# Patient Record
Sex: MALE | Race: WHITE | NOT HISPANIC OR LATINO | Employment: OTHER | URBAN - METROPOLITAN AREA
[De-identification: names, ages, dates, MRNs, and addresses within clinical notes are randomized per-mention and may not be internally consistent; named-entity substitution may affect disease eponyms.]

---

## 2023-05-30 PROBLEM — T45.1X5A PERIPHERAL NEUROPATHY DUE TO CHEMOTHERAPY (MULTI): Status: ACTIVE | Noted: 2023-05-30

## 2023-05-30 PROBLEM — E78.5 HYPERLIPIDEMIA: Status: ACTIVE | Noted: 2023-05-30

## 2023-05-30 PROBLEM — N28.89 LEFT RENAL MASS: Status: ACTIVE | Noted: 2023-05-30

## 2023-05-30 PROBLEM — E11.9 DIABETES MELLITUS, TYPE 2 (MULTI): Status: ACTIVE | Noted: 2023-05-30

## 2023-05-30 PROBLEM — G89.29 CHRONIC PAIN OF MULTIPLE JOINTS: Status: ACTIVE | Noted: 2023-05-30

## 2023-05-30 PROBLEM — K21.9 CHRONIC GERD: Status: ACTIVE | Noted: 2023-05-30

## 2023-05-30 PROBLEM — R68.89 FORGETFULNESS: Status: ACTIVE | Noted: 2023-05-30

## 2023-05-30 PROBLEM — R44.1 HALLUCINATIONS, VISUAL: Status: ACTIVE | Noted: 2023-05-30

## 2023-05-30 PROBLEM — L02.212 BACK ABSCESS: Status: RESOLVED | Noted: 2023-05-30 | Resolved: 2023-05-30

## 2023-05-30 PROBLEM — M25.50 CHRONIC PAIN OF MULTIPLE JOINTS: Status: ACTIVE | Noted: 2023-05-30

## 2023-05-30 PROBLEM — G62.0 PERIPHERAL NEUROPATHY DUE TO CHEMOTHERAPY (MULTI): Status: ACTIVE | Noted: 2023-05-30

## 2023-05-30 PROBLEM — R29.6 MULTIPLE FALLS: Status: ACTIVE | Noted: 2023-05-30

## 2023-05-30 PROBLEM — I10 HYPERTENSION: Status: ACTIVE | Noted: 2023-05-30

## 2023-05-30 PROBLEM — G91.9 HYDROCEPHALUS (MULTI): Status: ACTIVE | Noted: 2023-05-30

## 2023-05-30 PROBLEM — F32.A DEPRESSION: Status: ACTIVE | Noted: 2023-05-30

## 2023-05-30 PROBLEM — R42 DIZZINESS: Status: ACTIVE | Noted: 2023-05-30

## 2023-05-30 RX ORDER — ALLOPURINOL 100 MG/1
1 TABLET ORAL DAILY
COMMUNITY
End: 2023-08-04 | Stop reason: SDUPTHER

## 2023-05-30 RX ORDER — CHOLECALCIFEROL (VITAMIN D3) 50 MCG
1 TABLET ORAL DAILY
COMMUNITY

## 2023-05-30 RX ORDER — INSULIN GLARGINE 100 [IU]/ML
INJECTION, SOLUTION SUBCUTANEOUS
COMMUNITY
Start: 2020-12-18 | End: 2023-09-11 | Stop reason: SDUPTHER

## 2023-05-30 RX ORDER — LOSARTAN POTASSIUM 100 MG/1
50 TABLET ORAL DAILY
COMMUNITY
End: 2023-09-21 | Stop reason: SDUPTHER

## 2023-05-30 RX ORDER — METFORMIN HYDROCHLORIDE 500 MG/1
2 TABLET ORAL 2 TIMES DAILY
COMMUNITY
End: 2023-06-02 | Stop reason: DRUGHIGH

## 2023-05-30 RX ORDER — DULOXETIN HYDROCHLORIDE 60 MG/1
1 CAPSULE, DELAYED RELEASE ORAL DAILY
COMMUNITY
End: 2023-07-24 | Stop reason: SDUPTHER

## 2023-05-30 RX ORDER — PREGABALIN 225 MG/1
1 CAPSULE ORAL 2 TIMES DAILY
COMMUNITY
Start: 2015-09-09 | End: 2023-06-02 | Stop reason: SDUPTHER

## 2023-05-30 RX ORDER — MULTIVIT-MIN/FA/LYCOPEN/LUTEIN .4-300-25
1 TABLET ORAL DAILY
COMMUNITY

## 2023-05-30 RX ORDER — ICOSAPENT ETHYL 1 G/1
CAPSULE ORAL
COMMUNITY
End: 2023-10-19 | Stop reason: SDUPTHER

## 2023-05-30 RX ORDER — SUCRALFATE 1 G/1
1 TABLET ORAL EVERY 12 HOURS
COMMUNITY
Start: 2022-05-10 | End: 2023-09-11 | Stop reason: SDUPTHER

## 2023-05-30 RX ORDER — IBUPROFEN 800 MG/1
1 TABLET ORAL 3 TIMES DAILY PRN
COMMUNITY
Start: 2021-10-22 | End: 2023-06-27 | Stop reason: SDUPTHER

## 2023-05-30 RX ORDER — VITAMIN B COMPLEX
TABLET ORAL
COMMUNITY

## 2023-05-30 RX ORDER — AMITRIPTYLINE HYDROCHLORIDE 100 MG/1
0.5 TABLET ORAL NIGHTLY
COMMUNITY
Start: 2022-02-24 | End: 2023-08-04 | Stop reason: SDUPTHER

## 2023-05-30 RX ORDER — HYDROCODONE BITARTRATE AND ACETAMINOPHEN 10; 325 MG/1; MG/1
1 TABLET ORAL EVERY 6 HOURS PRN
COMMUNITY

## 2023-05-30 RX ORDER — LIDOCAINE 50 MG/G
PATCH TOPICAL SEE ADMIN INSTRUCTIONS
COMMUNITY

## 2023-05-30 RX ORDER — SIMVASTATIN 10 MG/1
1 TABLET, FILM COATED ORAL DAILY
COMMUNITY
End: 2023-06-15 | Stop reason: SDUPTHER

## 2023-05-30 RX ORDER — ACYCLOVIR 400 MG/1
1 TABLET ORAL 2 TIMES DAILY
COMMUNITY
Start: 2021-02-24 | End: 2023-08-22 | Stop reason: SDUPTHER

## 2023-05-30 RX ORDER — INSULIN LISPRO 100 [IU]/ML
INJECTION, SOLUTION INTRAVENOUS; SUBCUTANEOUS
COMMUNITY
Start: 2021-05-11 | End: 2023-06-02 | Stop reason: ALTCHOICE

## 2023-05-30 RX ORDER — METHOCARBAMOL 750 MG/1
TABLET, FILM COATED ORAL 3 TIMES DAILY PRN
COMMUNITY
End: 2023-07-24 | Stop reason: SDUPTHER

## 2023-05-30 RX ORDER — DULAGLUTIDE 3 MG/.5ML
INJECTION, SOLUTION SUBCUTANEOUS
COMMUNITY
End: 2023-06-02 | Stop reason: SDUPTHER

## 2023-05-30 RX ORDER — PANTOPRAZOLE SODIUM 40 MG/1
TABLET, DELAYED RELEASE ORAL
COMMUNITY
Start: 2021-08-19 | End: 2023-07-24 | Stop reason: SDUPTHER

## 2023-05-30 RX ORDER — TAMSULOSIN HYDROCHLORIDE 0.4 MG/1
2 CAPSULE ORAL DAILY
COMMUNITY
End: 2023-06-02 | Stop reason: SDUPTHER

## 2023-05-30 RX ORDER — AMLODIPINE BESYLATE 5 MG/1
1 TABLET ORAL DAILY
COMMUNITY
End: 2023-07-24 | Stop reason: ALTCHOICE

## 2023-06-02 ENCOUNTER — OFFICE VISIT (OUTPATIENT)
Dept: PRIMARY CARE | Facility: CLINIC | Age: 78
End: 2023-06-02
Payer: MEDICARE

## 2023-06-02 VITALS
HEIGHT: 66 IN | HEART RATE: 68 BPM | WEIGHT: 221 LBS | DIASTOLIC BLOOD PRESSURE: 72 MMHG | BODY MASS INDEX: 35.52 KG/M2 | SYSTOLIC BLOOD PRESSURE: 126 MMHG | OXYGEN SATURATION: 93 %

## 2023-06-02 DIAGNOSIS — E78.5 HYPERLIPIDEMIA, UNSPECIFIED HYPERLIPIDEMIA TYPE: ICD-10-CM

## 2023-06-02 DIAGNOSIS — T45.1X5A PERIPHERAL NEUROPATHY DUE TO CHEMOTHERAPY (MULTI): ICD-10-CM

## 2023-06-02 DIAGNOSIS — R68.89 FORGETFULNESS: ICD-10-CM

## 2023-06-02 DIAGNOSIS — K21.9 CHRONIC GERD: ICD-10-CM

## 2023-06-02 DIAGNOSIS — C83.30 DIFFUSE LARGE B-CELL LYMPHOMA, UNSPECIFIED BODY REGION (MULTI): ICD-10-CM

## 2023-06-02 DIAGNOSIS — E66.01 CLASS 2 SEVERE OBESITY DUE TO EXCESS CALORIES WITH SERIOUS COMORBIDITY AND BODY MASS INDEX (BMI) OF 36.0 TO 36.9 IN ADULT (MULTI): ICD-10-CM

## 2023-06-02 DIAGNOSIS — F33.0 MILD EPISODE OF RECURRENT MAJOR DEPRESSIVE DISORDER (CMS-HCC): ICD-10-CM

## 2023-06-02 DIAGNOSIS — G89.29 CHRONIC PAIN OF MULTIPLE JOINTS: ICD-10-CM

## 2023-06-02 DIAGNOSIS — N40.0 BENIGN PROSTATIC HYPERPLASIA WITHOUT LOWER URINARY TRACT SYMPTOMS: ICD-10-CM

## 2023-06-02 DIAGNOSIS — Z00.00 ROUTINE GENERAL MEDICAL EXAMINATION AT HEALTH CARE FACILITY: Primary | ICD-10-CM

## 2023-06-02 DIAGNOSIS — Z02.89 MEDICATION MANAGEMENT CONTRACT AGREEMENT: ICD-10-CM

## 2023-06-02 DIAGNOSIS — Z79.4 TYPE 2 DIABETES MELLITUS WITHOUT COMPLICATION, WITH LONG-TERM CURRENT USE OF INSULIN (MULTI): ICD-10-CM

## 2023-06-02 DIAGNOSIS — E11.49 TYPE II OR UNSPECIFIED TYPE DIABETES MELLITUS WITH NEUROLOGICAL MANIFESTATIONS, NOT STATED AS UNCONTROLLED(250.60) (MULTI): ICD-10-CM

## 2023-06-02 DIAGNOSIS — G62.0 PERIPHERAL NEUROPATHY DUE TO CHEMOTHERAPY (MULTI): ICD-10-CM

## 2023-06-02 DIAGNOSIS — M25.50 CHRONIC PAIN OF MULTIPLE JOINTS: ICD-10-CM

## 2023-06-02 DIAGNOSIS — E11.9 TYPE 2 DIABETES MELLITUS WITHOUT COMPLICATION, WITH LONG-TERM CURRENT USE OF INSULIN (MULTI): ICD-10-CM

## 2023-06-02 PROBLEM — R44.1 HALLUCINATIONS, VISUAL: Status: RESOLVED | Noted: 2023-05-30 | Resolved: 2023-06-02

## 2023-06-02 PROBLEM — G91.9 HYDROCEPHALUS (MULTI): Status: RESOLVED | Noted: 2023-05-30 | Resolved: 2023-06-02

## 2023-06-02 LAB
AMPHETAMINE (PRESENCE) IN URINE BY SCREEN METHOD: ABNORMAL
BARBITURATES PRESENCE IN URINE BY SCREEN METHOD: ABNORMAL
BENZODIAZEPINE (PRESENCE) IN URINE BY SCREEN METHOD: ABNORMAL
CANNABINOIDS IN URINE BY SCREEN METHOD: ABNORMAL
COCAINE (PRESENCE) IN URINE BY SCREEN METHOD: ABNORMAL
DRUG SCREEN COMMENT URINE: ABNORMAL
FENTANYL URINE: ABNORMAL
METHADONE (PRESENCE) IN URINE BY SCREEN METHOD: ABNORMAL
OPIATES (PRESENCE) IN URINE BY SCREEN METHOD: ABNORMAL
OXYCODONE (PRESENCE) IN URINE BY SCREEN METHOD: ABNORMAL
PHENCYCLIDINE (PRESENCE) IN URINE BY SCREEN METHOD: ABNORMAL
POC ALBUMIN /CREATININE RATIO MANUALLY ENTERED: ABNORMAL UG/MG CREAT
POC HEMOGLOBIN A1C: 7 % (ref 4.2–6.5)
POC URINE ALBUMIN: 80 MG/L
POC URINE CREATININE: 50 MG/DL

## 2023-06-02 PROCEDURE — 4010F ACE/ARB THERAPY RXD/TAKEN: CPT | Performed by: NURSE PRACTITIONER

## 2023-06-02 PROCEDURE — G0439 PPPS, SUBSEQ VISIT: HCPCS | Performed by: NURSE PRACTITIONER

## 2023-06-02 PROCEDURE — 3078F DIAST BP <80 MM HG: CPT | Performed by: NURSE PRACTITIONER

## 2023-06-02 PROCEDURE — 1036F TOBACCO NON-USER: CPT | Performed by: NURSE PRACTITIONER

## 2023-06-02 PROCEDURE — 80366 DRUG SCREENING PREGABALIN: CPT

## 2023-06-02 PROCEDURE — 1159F MED LIST DOCD IN RCRD: CPT | Performed by: NURSE PRACTITIONER

## 2023-06-02 PROCEDURE — 80361 OPIATES 1 OR MORE: CPT

## 2023-06-02 PROCEDURE — 82044 UR ALBUMIN SEMIQUANTITATIVE: CPT | Performed by: NURSE PRACTITIONER

## 2023-06-02 PROCEDURE — 3074F SYST BP LT 130 MM HG: CPT | Performed by: NURSE PRACTITIONER

## 2023-06-02 PROCEDURE — 1157F ADVNC CARE PLAN IN RCRD: CPT | Performed by: NURSE PRACTITIONER

## 2023-06-02 PROCEDURE — 1170F FXNL STATUS ASSESSED: CPT | Performed by: NURSE PRACTITIONER

## 2023-06-02 PROCEDURE — 1160F RVW MEDS BY RX/DR IN RCRD: CPT | Performed by: NURSE PRACTITIONER

## 2023-06-02 PROCEDURE — 83036 HEMOGLOBIN GLYCOSYLATED A1C: CPT | Performed by: NURSE PRACTITIONER

## 2023-06-02 PROCEDURE — 80365 DRUG SCREENING OXYCODONE: CPT

## 2023-06-02 PROCEDURE — 80307 DRUG TEST PRSMV CHEM ANLYZR: CPT

## 2023-06-02 RX ORDER — FLASH GLUCOSE SENSOR
2 KIT MISCELLANEOUS DAILY
COMMUNITY
End: 2023-06-02 | Stop reason: SDUPTHER

## 2023-06-02 RX ORDER — TAMSULOSIN HYDROCHLORIDE 0.4 MG/1
0.8 CAPSULE ORAL DAILY
Qty: 90 CAPSULE | Refills: 2 | Status: SHIPPED | OUTPATIENT
Start: 2023-06-02 | End: 2023-07-24 | Stop reason: SDUPTHER

## 2023-06-02 RX ORDER — PREGABALIN 225 MG/1
225 CAPSULE ORAL 2 TIMES DAILY
Qty: 180 CAPSULE | Refills: 1 | Status: SHIPPED | OUTPATIENT
Start: 2023-06-02 | End: 2024-06-01

## 2023-06-02 RX ORDER — METFORMIN HYDROCHLORIDE 1000 MG/1
1000 TABLET ORAL
COMMUNITY
Start: 2023-01-17 | End: 2023-06-15 | Stop reason: SDUPTHER

## 2023-06-02 RX ORDER — DULAGLUTIDE 3 MG/.5ML
3 INJECTION, SOLUTION SUBCUTANEOUS
Qty: 3 ML | Refills: 1 | Status: SHIPPED | OUTPATIENT
Start: 2023-06-02 | End: 2023-08-31

## 2023-06-02 RX ORDER — ROPINIROLE 0.25 MG/1
0.25 TABLET, FILM COATED ORAL 3 TIMES DAILY
COMMUNITY
Start: 2023-02-21 | End: 2023-06-27 | Stop reason: SDUPTHER

## 2023-06-02 RX ORDER — FLASH GLUCOSE SENSOR
2 KIT MISCELLANEOUS DAILY
Qty: 1 EACH | Refills: 2 | Status: SHIPPED | OUTPATIENT
Start: 2023-06-02

## 2023-06-02 RX ORDER — DONEPEZIL HYDROCHLORIDE 5 MG/1
5 TABLET, FILM COATED ORAL NIGHTLY
COMMUNITY
Start: 2022-06-28 | End: 2023-08-04 | Stop reason: SDUPTHER

## 2023-06-02 RX ORDER — PEN NEEDLE, DIABETIC 32GX 5/32"
NEEDLE, DISPOSABLE MISCELLANEOUS 3 TIMES DAILY
COMMUNITY
Start: 2023-03-10 | End: 2023-07-24 | Stop reason: SDUPTHER

## 2023-06-02 ASSESSMENT — ENCOUNTER SYMPTOMS
GASTROINTESTINAL NEGATIVE: 1
BACK PAIN: 1
RESPIRATORY NEGATIVE: 1
OCCASIONAL FEELINGS OF UNSTEADINESS: 0
ARTHRALGIAS: 1
PSYCHIATRIC NEGATIVE: 1
HEMATOLOGIC/LYMPHATIC NEGATIVE: 1
CONSTITUTIONAL NEGATIVE: 1
LOSS OF SENSATION IN FEET: 0
DEPRESSION: 0
CARDIOVASCULAR NEGATIVE: 1
NEUROLOGICAL NEGATIVE: 1

## 2023-06-02 ASSESSMENT — ACTIVITIES OF DAILY LIVING (ADL)
BATHING: INDEPENDENT
DRESSING: INDEPENDENT
DOING_HOUSEWORK: INDEPENDENT
TAKING_MEDICATION: INDEPENDENT
GROCERY_SHOPPING: INDEPENDENT
MANAGING_FINANCES: INDEPENDENT

## 2023-06-02 ASSESSMENT — COLUMBIA-SUICIDE SEVERITY RATING SCALE - C-SSRS
6. HAVE YOU EVER DONE ANYTHING, STARTED TO DO ANYTHING, OR PREPARED TO DO ANYTHING TO END YOUR LIFE?: NO
1. IN THE PAST MONTH, HAVE YOU WISHED YOU WERE DEAD OR WISHED YOU COULD GO TO SLEEP AND NOT WAKE UP?: NO
2. HAVE YOU ACTUALLY HAD ANY THOUGHTS OF KILLING YOURSELF?: NO

## 2023-06-02 ASSESSMENT — PATIENT HEALTH QUESTIONNAIRE - PHQ9
SUM OF ALL RESPONSES TO PHQ9 QUESTIONS 1 AND 2: 0
1. LITTLE INTEREST OR PLEASURE IN DOING THINGS: NOT AT ALL
2. FEELING DOWN, DEPRESSED OR HOPELESS: NOT AT ALL

## 2023-06-02 NOTE — PATIENT INSTRUCTIONS
Continue to monitor your blood glucose.  No changes to medications    Please follow-up with oncology let me know if you have any issues    Continue to try to increase her activity as tolerated    Please eat a healthy well-balanced diet

## 2023-06-03 ENCOUNTER — TELEPHONE (OUTPATIENT)
Dept: PRIMARY CARE | Facility: CLINIC | Age: 78
End: 2023-06-03
Payer: MEDICARE

## 2023-06-05 NOTE — TELEPHONE ENCOUNTER
Spoke to pharmacist  and she is going to give pt 6 ml of trulicity and 6 each of freestyle joe for 90 days for both

## 2023-06-06 LAB — PREGABALIN,URINE: 213.4 UG/ML

## 2023-06-08 LAB
6-ACETYLMORPHINE: <25 NG/ML
CODEINE: <50 NG/ML
HYDROCODONE: 206 NG/ML
HYDROMORPHONE: 47 NG/ML
MORPHINE URINE: <50 NG/ML
NORHYDROCODONE: 177 NG/ML
NOROXYCODONE: <25 NG/ML
OXYCODONE: <25 NG/ML
OXYMORPHONE: <25 NG/ML

## 2023-06-14 ENCOUNTER — TELEPHONE (OUTPATIENT)
Dept: PRIMARY CARE | Facility: CLINIC | Age: 78
End: 2023-06-14
Payer: MEDICARE

## 2023-06-14 DIAGNOSIS — Z79.4 TYPE 2 DIABETES MELLITUS WITHOUT COMPLICATION, WITH LONG-TERM CURRENT USE OF INSULIN (MULTI): ICD-10-CM

## 2023-06-14 DIAGNOSIS — E78.5 HYPERLIPIDEMIA, UNSPECIFIED HYPERLIPIDEMIA TYPE: ICD-10-CM

## 2023-06-14 DIAGNOSIS — E11.9 TYPE 2 DIABETES MELLITUS WITHOUT COMPLICATION, WITH LONG-TERM CURRENT USE OF INSULIN (MULTI): ICD-10-CM

## 2023-06-14 NOTE — TELEPHONE ENCOUNTER
Pt calling for med refill.    LOV 6/2/2023    Simvastatin 10mg  1 PO every day  90 days    Metformin 1000mg  1 PO BID  90 days    BK Lr

## 2023-06-15 RX ORDER — SIMVASTATIN 10 MG/1
10 TABLET, FILM COATED ORAL DAILY
Qty: 90 TABLET | Refills: 2 | Status: SHIPPED | OUTPATIENT
Start: 2023-06-15

## 2023-06-15 RX ORDER — METFORMIN HYDROCHLORIDE 1000 MG/1
1000 TABLET ORAL
Qty: 180 TABLET | Refills: 2 | Status: SHIPPED | OUTPATIENT
Start: 2023-06-15

## 2023-06-27 ENCOUNTER — TELEPHONE (OUTPATIENT)
Dept: PRIMARY CARE | Facility: CLINIC | Age: 78
End: 2023-06-27
Payer: MEDICARE

## 2023-06-27 DIAGNOSIS — M25.50 CHRONIC PAIN OF MULTIPLE JOINTS: ICD-10-CM

## 2023-06-27 DIAGNOSIS — G62.0 PERIPHERAL NEUROPATHY DUE TO CHEMOTHERAPY (MULTI): ICD-10-CM

## 2023-06-27 DIAGNOSIS — G89.29 CHRONIC PAIN OF MULTIPLE JOINTS: ICD-10-CM

## 2023-06-27 DIAGNOSIS — T45.1X5A PERIPHERAL NEUROPATHY DUE TO CHEMOTHERAPY (MULTI): ICD-10-CM

## 2023-06-27 PROBLEM — M15.9 GENERALIZED OSTEOARTHROSIS, INVOLVING MULTIPLE SITES: Status: ACTIVE | Noted: 2023-06-27

## 2023-06-27 PROBLEM — G47.10 HYPERSOMNIA WITH SLEEP APNEA: Status: ACTIVE | Noted: 2023-06-27

## 2023-06-27 PROBLEM — I10 ESSENTIAL HYPERTENSION, BENIGN: Status: ACTIVE | Noted: 2023-06-27

## 2023-06-27 PROBLEM — G47.30 HYPERSOMNIA WITH SLEEP APNEA: Status: ACTIVE | Noted: 2023-06-27

## 2023-06-27 PROBLEM — M10.9 GOUTY ARTHROPATHY: Status: ACTIVE | Noted: 2023-06-27

## 2023-06-27 NOTE — TELEPHONE ENCOUNTER
Rx Refill Request Telephone Encounter    Name:  Jossue Tam  :  [id number]  Medication Name:    Iburopfen 800 mg 1 tab tid - 90 day  Ropinirole  0.25 mg  1 tab tid - 90 day  Specific Pharmacy location:  [facility]  Date of last appointment:  23  Date of next appointment:  na  Best number to reach patient:  [phone]             Tranexamic Acid Counseling:  Patient advised of the small risk of bleeding problems with tranexamic acid. They were also instructed to call if they developed any nausea, vomiting or diarrhea. All of the patient's questions and concerns were addressed.

## 2023-06-30 RX ORDER — IBUPROFEN 800 MG/1
800 TABLET ORAL 3 TIMES DAILY PRN
Qty: 90 TABLET | Refills: 0 | Status: SHIPPED | OUTPATIENT
Start: 2023-06-30

## 2023-06-30 RX ORDER — ROPINIROLE 0.25 MG/1
0.25 TABLET, FILM COATED ORAL 3 TIMES DAILY
Qty: 90 TABLET | Refills: 0 | Status: SHIPPED | OUTPATIENT
Start: 2023-06-30 | End: 2023-09-21 | Stop reason: SDUPTHER

## 2023-07-05 ENCOUNTER — OFFICE VISIT (OUTPATIENT)
Dept: PRIMARY CARE | Facility: CLINIC | Age: 78
End: 2023-07-05
Payer: MEDICARE

## 2023-07-05 VITALS
DIASTOLIC BLOOD PRESSURE: 67 MMHG | HEART RATE: 84 BPM | SYSTOLIC BLOOD PRESSURE: 96 MMHG | BODY MASS INDEX: 34.06 KG/M2 | OXYGEN SATURATION: 93 % | HEIGHT: 67 IN | TEMPERATURE: 97 F | WEIGHT: 217 LBS

## 2023-07-05 DIAGNOSIS — E11.9 TYPE 2 DIABETES MELLITUS WITHOUT COMPLICATION, WITHOUT LONG-TERM CURRENT USE OF INSULIN (MULTI): ICD-10-CM

## 2023-07-05 DIAGNOSIS — R42 DIZZINESS: Primary | ICD-10-CM

## 2023-07-05 DIAGNOSIS — C83.30 DIFFUSE LARGE B-CELL LYMPHOMA, UNSPECIFIED BODY REGION (MULTI): ICD-10-CM

## 2023-07-05 DIAGNOSIS — I10 ESSENTIAL HYPERTENSION, BENIGN: ICD-10-CM

## 2023-07-05 LAB
POC ALBUMIN /CREATININE RATIO MANUALLY ENTERED: <30 UG/MG CREAT
POC APPEARANCE, URINE: CLEAR
POC BILIRUBIN, URINE: NEGATIVE
POC BLOOD, URINE: NEGATIVE
POC COLOR, URINE: YELLOW
POC GLUCOSE, URINE: ABNORMAL MG/DL
POC KETONES, URINE: ABNORMAL MG/DL
POC LEUKOCYTES, URINE: NEGATIVE
POC NITRITE,URINE: NEGATIVE
POC PH, URINE: 6 PH
POC PROTEIN, URINE: NEGATIVE MG/DL
POC SPECIFIC GRAVITY, URINE: 1.01
POC URINE ALBUMIN: 30 MG/L
POC URINE CREATININE: 100 MG/DL
POC UROBILINOGEN, URINE: 0.2 EU/DL

## 2023-07-05 PROCEDURE — 82044 UR ALBUMIN SEMIQUANTITATIVE: CPT | Performed by: NURSE PRACTITIONER

## 2023-07-05 PROCEDURE — 1036F TOBACCO NON-USER: CPT | Performed by: NURSE PRACTITIONER

## 2023-07-05 PROCEDURE — 1159F MED LIST DOCD IN RCRD: CPT | Performed by: NURSE PRACTITIONER

## 2023-07-05 PROCEDURE — 1160F RVW MEDS BY RX/DR IN RCRD: CPT | Performed by: NURSE PRACTITIONER

## 2023-07-05 PROCEDURE — 81002 URINALYSIS NONAUTO W/O SCOPE: CPT | Performed by: NURSE PRACTITIONER

## 2023-07-05 PROCEDURE — 3078F DIAST BP <80 MM HG: CPT | Performed by: NURSE PRACTITIONER

## 2023-07-05 PROCEDURE — 99214 OFFICE O/P EST MOD 30 MIN: CPT | Performed by: NURSE PRACTITIONER

## 2023-07-05 PROCEDURE — 1157F ADVNC CARE PLAN IN RCRD: CPT | Performed by: NURSE PRACTITIONER

## 2023-07-05 PROCEDURE — 3074F SYST BP LT 130 MM HG: CPT | Performed by: NURSE PRACTITIONER

## 2023-07-05 ASSESSMENT — ENCOUNTER SYMPTOMS
HEADACHES: 0
PSYCHIATRIC NEGATIVE: 1
RESPIRATORY NEGATIVE: 1
LIGHT-HEADEDNESS: 1
BACK PAIN: 1
CARDIOVASCULAR NEGATIVE: 1
DIZZINESS: 1
SPEECH DIFFICULTY: 0
ARTHRALGIAS: 1
FATIGUE: 1
GASTROINTESTINAL NEGATIVE: 1

## 2023-07-05 NOTE — ASSESSMENT & PLAN NOTE
Concerns with symptomatic dizziness.  Patient on multiple sedating type medications.  We will work towards decreasing amitriptyline as he is also on 60 mg of Cymbalta.  Patient currently weaning/using less Lyrica.  We will continue with this.  As long as pain control.  We will also decrease blood pressure medication.  Recent blood work reviewed and stable.  We will follow-up closely if patient continues to have symptoms may need work-up with a CAT scan/MRI

## 2023-07-05 NOTE — PROGRESS NOTES
"Subjective   Patient ID: Jossue Tam is a 78 y.o. male who presents for Dizziness which has been occurring intermittently over the past three months. States he has felt nauseated and has even vomited at times  as  well. Pt feels the Lyrica may be making matters worse. Pt's son is a pharmacist and he advised he cut down the Lyrica dose. Pt has done so and feels this helped the dizziness.     Patient states the dizziness has improved slightly since taking the Lyrica as needed only during the day.  He does take 1 every night.  Discussed with patient he is on multiple blood pressure medications blood pressure was low today.  Patient states he is staying hydrated.  Also reviewed with patient concerns for the amitriptyline Cymbalta all can be increased to dative causing some of his symptoms.  Patient states even with amitriptyline and 100 mg he is not sleeping.  Patient did follow-up with oncology has a PET scan scheduled in 2 weeks.  Patient also follows routinely with pain management         Review of Systems   Constitutional:  Positive for fatigue.   HENT: Negative.     Respiratory: Negative.     Cardiovascular: Negative.    Gastrointestinal: Negative.    Genitourinary: Negative.    Musculoskeletal:  Positive for arthralgias and back pain.   Neurological:  Positive for dizziness and light-headedness. Negative for speech difficulty and headaches.   Psychiatric/Behavioral: Negative.         Objective   BP 96/67   Pulse 84   Temp 36.1 °C (97 °F)   Ht 1.702 m (5' 7\")   Wt 98.4 kg (217 lb)   SpO2 93%   BMI 33.99 kg/m²     Physical Exam  Vitals reviewed.   HENT:      Head: Normocephalic.   Cardiovascular:      Rate and Rhythm: Normal rate.   Pulmonary:      Effort: Pulmonary effort is normal.      Breath sounds: Normal breath sounds.   Musculoskeletal:         General: Normal range of motion.   Skin:     General: Skin is warm.      Capillary Refill: Capillary refill takes less than 2 seconds.   Neurological:      " General: No focal deficit present.      Mental Status: He is oriented to person, place, and time.      Cranial Nerves: No cranial nerve deficit.      Sensory: No sensory deficit.      Motor: No weakness.      Coordination: Coordination normal.   Psychiatric:         Mood and Affect: Mood normal.         Behavior: Behavior normal.         Thought Content: Thought content normal.         Judgment: Judgment normal.         Assessment/Plan   Problem List Items Addressed This Visit       Dizziness - Primary     Concerns with symptomatic dizziness.  Patient on multiple sedating type medications.  We will work towards decreasing amitriptyline as he is also on 60 mg of Cymbalta.  Patient currently weaning/using less Lyrica.  We will continue with this.  As long as pain control.  We will also decrease blood pressure medication.  Recent blood work reviewed and stable.  We will follow-up closely if patient continues to have symptoms may need work-up with a CAT scan/MRI         Diffuse large B-cell lymphoma, unspecified body region (CMS/HCC)     Patient has upcoming PET scan scheduled through oncology         Essential hypertension, benign     Patient hypotensive today.  Discussed we will decrease Cozaar/losartan with close follow-up.  Patient symptomatic been having dizzy episodes.

## 2023-07-05 NOTE — PATIENT INSTRUCTIONS
1. decrease your losartan to half a tab daily  2.  Please take half tablet of amitriptyline nightly     follow-up in 2 to 3 weeks sooner if needed if symptoms worsen or do not improve

## 2023-07-05 NOTE — ASSESSMENT & PLAN NOTE
Patient hypotensive today.  Discussed we will decrease Cozaar/losartan with close follow-up.  Patient symptomatic been having dizzy episodes.

## 2023-07-12 ENCOUNTER — TELEPHONE (OUTPATIENT)
Dept: PRIMARY CARE | Facility: CLINIC | Age: 78
End: 2023-07-12
Payer: MEDICARE

## 2023-07-12 DIAGNOSIS — N40.0 BENIGN PROSTATIC HYPERPLASIA WITHOUT LOWER URINARY TRACT SYMPTOMS: ICD-10-CM

## 2023-07-12 DIAGNOSIS — E11.9 TYPE 2 DIABETES MELLITUS WITHOUT COMPLICATION, WITHOUT LONG-TERM CURRENT USE OF INSULIN (MULTI): ICD-10-CM

## 2023-07-12 NOTE — TELEPHONE ENCOUNTER
Rx Refill Request Telephone Encounter    Name:  Jossue Tam  :  [id number]  Medication Name:    JARDIANCE 25MG Q/D   TAMSULOSIN 0.4MG BID 90 DAY SUPPLY   Specific Pharmacy location:  [facility]   Date of last appointment:  2023  Date of next appointment:  2023  Best number to reach patient:    [phone]

## 2023-07-24 ENCOUNTER — OFFICE VISIT (OUTPATIENT)
Dept: PRIMARY CARE | Facility: CLINIC | Age: 78
End: 2023-07-24
Payer: MEDICARE

## 2023-07-24 VITALS
HEART RATE: 83 BPM | HEIGHT: 67 IN | DIASTOLIC BLOOD PRESSURE: 74 MMHG | BODY MASS INDEX: 33.9 KG/M2 | SYSTOLIC BLOOD PRESSURE: 110 MMHG | WEIGHT: 216 LBS | OXYGEN SATURATION: 96 %

## 2023-07-24 DIAGNOSIS — G62.0 PERIPHERAL NEUROPATHY DUE TO CHEMOTHERAPY (MULTI): ICD-10-CM

## 2023-07-24 DIAGNOSIS — E11.59 TYPE 2 DIABETES MELLITUS WITH OTHER CIRCULATORY COMPLICATION, WITH LONG-TERM CURRENT USE OF INSULIN (MULTI): ICD-10-CM

## 2023-07-24 DIAGNOSIS — I10 PRIMARY HYPERTENSION: Primary | ICD-10-CM

## 2023-07-24 DIAGNOSIS — N40.0 BENIGN PROSTATIC HYPERPLASIA WITHOUT LOWER URINARY TRACT SYMPTOMS: ICD-10-CM

## 2023-07-24 DIAGNOSIS — F33.0 MILD EPISODE OF RECURRENT MAJOR DEPRESSIVE DISORDER (CMS-HCC): ICD-10-CM

## 2023-07-24 DIAGNOSIS — Z79.4 TYPE 2 DIABETES MELLITUS WITH OTHER CIRCULATORY COMPLICATION, WITH LONG-TERM CURRENT USE OF INSULIN (MULTI): ICD-10-CM

## 2023-07-24 DIAGNOSIS — K21.9 CHRONIC GERD: ICD-10-CM

## 2023-07-24 DIAGNOSIS — M51.36 DDD (DEGENERATIVE DISC DISEASE), LUMBAR: ICD-10-CM

## 2023-07-24 DIAGNOSIS — T45.1X5A PERIPHERAL NEUROPATHY DUE TO CHEMOTHERAPY (MULTI): ICD-10-CM

## 2023-07-24 DIAGNOSIS — M50.30 DDD (DEGENERATIVE DISC DISEASE), CERVICAL: ICD-10-CM

## 2023-07-24 PROCEDURE — 99214 OFFICE O/P EST MOD 30 MIN: CPT | Performed by: NURSE PRACTITIONER

## 2023-07-24 PROCEDURE — 3078F DIAST BP <80 MM HG: CPT | Performed by: NURSE PRACTITIONER

## 2023-07-24 PROCEDURE — 1157F ADVNC CARE PLAN IN RCRD: CPT | Performed by: NURSE PRACTITIONER

## 2023-07-24 PROCEDURE — 1159F MED LIST DOCD IN RCRD: CPT | Performed by: NURSE PRACTITIONER

## 2023-07-24 PROCEDURE — 3074F SYST BP LT 130 MM HG: CPT | Performed by: NURSE PRACTITIONER

## 2023-07-24 PROCEDURE — 1125F AMNT PAIN NOTED PAIN PRSNT: CPT | Performed by: NURSE PRACTITIONER

## 2023-07-24 PROCEDURE — 1160F RVW MEDS BY RX/DR IN RCRD: CPT | Performed by: NURSE PRACTITIONER

## 2023-07-24 PROCEDURE — 1036F TOBACCO NON-USER: CPT | Performed by: NURSE PRACTITIONER

## 2023-07-24 RX ORDER — AMOXICILLIN 500 MG/1
TABLET, FILM COATED ORAL
COMMUNITY
Start: 2023-07-06

## 2023-07-24 RX ORDER — PANTOPRAZOLE SODIUM 40 MG/1
TABLET, DELAYED RELEASE ORAL
Qty: 90 TABLET | Refills: 1 | Status: SHIPPED | OUTPATIENT
Start: 2023-07-24

## 2023-07-24 RX ORDER — AMLODIPINE BESYLATE 5 MG/1
5 TABLET ORAL DAILY
Qty: 90 TABLET | Refills: 1 | Status: CANCELLED | OUTPATIENT
Start: 2023-07-24

## 2023-07-24 RX ORDER — NALOXONE HYDROCHLORIDE 4 MG/.1ML
4 SPRAY NASAL AS NEEDED
Qty: 2 EACH | Refills: 0 | Status: SHIPPED | OUTPATIENT
Start: 2023-07-24 | End: 2024-07-23

## 2023-07-24 RX ORDER — METHOCARBAMOL 750 MG/1
750 TABLET, FILM COATED ORAL 3 TIMES DAILY PRN
Qty: 270 TABLET | Refills: 1 | Status: SHIPPED | OUTPATIENT
Start: 2023-07-24

## 2023-07-24 RX ORDER — PEN NEEDLE, DIABETIC 32GX 5/32"
1 NEEDLE, DISPOSABLE MISCELLANEOUS 3 TIMES DAILY
Qty: 270 EACH | Refills: 2 | Status: SHIPPED | OUTPATIENT
Start: 2023-07-24 | End: 2023-10-22

## 2023-07-24 RX ORDER — TAMSULOSIN HYDROCHLORIDE 0.4 MG/1
0.8 CAPSULE ORAL DAILY
Qty: 90 CAPSULE | Refills: 2 | Status: SHIPPED | OUTPATIENT
Start: 2023-07-24

## 2023-07-24 RX ORDER — TAMSULOSIN HYDROCHLORIDE 0.4 MG/1
CAPSULE ORAL
Qty: 90 CAPSULE | Refills: 2 | OUTPATIENT
Start: 2023-07-24

## 2023-07-24 RX ORDER — DULOXETIN HYDROCHLORIDE 60 MG/1
60 CAPSULE, DELAYED RELEASE ORAL DAILY
Qty: 90 CAPSULE | Refills: 1 | Status: SHIPPED | OUTPATIENT
Start: 2023-07-24

## 2023-07-24 ASSESSMENT — PATIENT HEALTH QUESTIONNAIRE - PHQ9
SUM OF ALL RESPONSES TO PHQ9 QUESTIONS 1 AND 2: 0
2. FEELING DOWN, DEPRESSED OR HOPELESS: NOT AT ALL
1. LITTLE INTEREST OR PLEASURE IN DOING THINGS: NOT AT ALL

## 2023-07-24 ASSESSMENT — ENCOUNTER SYMPTOMS
DIZZINESS: 0
RESPIRATORY NEGATIVE: 1
CONSTITUTIONAL NEGATIVE: 1
ARTHRALGIAS: 1
CARDIOVASCULAR NEGATIVE: 1
PSYCHIATRIC NEGATIVE: 1
HEADACHES: 0
BACK PAIN: 1
LIGHT-HEADEDNESS: 0

## 2023-07-24 NOTE — PATIENT INSTRUCTIONS
Please continue to take the Lyrica 1 tablet daily and 2 tablets every other day as this seems to be working to help with your dizziness and you continue to have adequate pain control    Please stop taking amlodipine as your blood pressure is still well controlled and decreasing the dosing of this medication has been beneficial.  If blood pressure does rise we will increase the losartan back to 100 mg daily.    Please continue to eat healthy well-balanced diet    Routine follow-up

## 2023-07-24 NOTE — PROGRESS NOTES
"Subjective   Patient ID: Jossue Tam is a 78 y.o. male who presents for Follow-up.    Here for 3-week follow-up on dizziness/fatigue.  Patient had had low blood pressures.  At that time we did some medication changes we have to his amlodipine as well as his losartan.  Patient states he feels much better.  He is also taking Lyrica once daily and every other day he will take it twice a day.  This is helped with his blood pressure and his overall fogginess and dizziness.  Patient denies any current concerns or issues.    Cardiology: followed up with Dr. Pleitez who thought everything was doing well         Review of Systems   Constitutional: Negative.    Respiratory: Negative.     Cardiovascular: Negative.    Musculoskeletal:  Positive for arthralgias and back pain.   Neurological:  Negative for dizziness, light-headedness and headaches.   Psychiatric/Behavioral: Negative.         Objective   /74   Pulse 83   Ht 1.702 m (5' 7\")   Wt 98 kg (216 lb)   SpO2 96%   BMI 33.83 kg/m²     Physical Exam  Vitals reviewed.   Cardiovascular:      Rate and Rhythm: Normal rate.      Heart sounds: Normal heart sounds.   Pulmonary:      Effort: Pulmonary effort is normal.      Breath sounds: Normal breath sounds.   Skin:     Capillary Refill: Capillary refill takes less than 2 seconds.   Neurological:      General: No focal deficit present.      Mental Status: He is alert and oriented to person, place, and time.   Psychiatric:         Mood and Affect: Mood normal.         Behavior: Behavior normal.         Thought Content: Thought content normal.         Judgment: Judgment normal.         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)    DDD (degenerative disc disease), cervical    Relevant Medications    DULoxetine (Cymbalta) 60 mg DR capsule    methocarbamol (Robaxin) 750 mg tablet    naloxone (Narcan) 4 mg/0.1 mL nasal spray    DDD (degenerative disc disease), lumbar    Relevant Medications    DULoxetine (Cymbalta) 60 mg DR capsule    methocarbamol (Robaxin) 750 mg tablet    naloxone (Narcan) 4 mg/0.1 mL nasal spray     Other Visit Diagnoses       Benign prostatic hyperplasia without lower urinary tract symptoms        Relevant Medications    tamsulosin (Flomax) 0.4 mg 24 hr capsule          Reviewed with patient we will stop the amlodipine due to continued improvement in blood pressure.  Continue with losartan continue to monitor blood pressure blood pressure does elevate we may need to increase the losartan.  Encouragement given to patient to continue to exercise eat healthy well-balanced diet.  Refills sent on multiple medications

## 2023-08-04 DIAGNOSIS — K21.9 CHRONIC GERD: ICD-10-CM

## 2023-08-04 DIAGNOSIS — I10 HYPERTENSION, UNSPECIFIED TYPE: ICD-10-CM

## 2023-08-04 DIAGNOSIS — F33.0 MILD EPISODE OF RECURRENT MAJOR DEPRESSIVE DISORDER (CMS-HCC): Primary | ICD-10-CM

## 2023-08-04 DIAGNOSIS — M10.9 GOUTY ARTHROPATHY: ICD-10-CM

## 2023-08-04 DIAGNOSIS — R68.89 FORGETFULNESS: ICD-10-CM

## 2023-08-04 RX ORDER — AMITRIPTYLINE HYDROCHLORIDE 100 MG/1
50 TABLET ORAL NIGHTLY
Qty: 45 TABLET | Refills: 1 | Status: SHIPPED | OUTPATIENT
Start: 2023-08-04

## 2023-08-04 RX ORDER — AMLODIPINE BESYLATE 5 MG/1
1 TABLET ORAL DAILY
COMMUNITY
End: 2023-08-04 | Stop reason: SDUPTHER

## 2023-08-04 RX ORDER — ALLOPURINOL 100 MG/1
100 TABLET ORAL DAILY
Qty: 90 TABLET | Refills: 1 | Status: SHIPPED | OUTPATIENT
Start: 2023-08-04

## 2023-08-04 RX ORDER — METOCLOPRAMIDE 10 MG/1
5 TABLET ORAL 3 TIMES DAILY
Qty: 270 TABLET | Refills: 1 | Status: SHIPPED | OUTPATIENT
Start: 2023-08-04

## 2023-08-04 RX ORDER — METOCLOPRAMIDE 5 MG/1
5 TABLET ORAL 4 TIMES DAILY
COMMUNITY
End: 2023-08-04 | Stop reason: SDUPTHER

## 2023-08-04 RX ORDER — AMLODIPINE BESYLATE 5 MG/1
5 TABLET ORAL DAILY
Qty: 90 TABLET | Refills: 1 | Status: SHIPPED | OUTPATIENT
Start: 2023-08-04

## 2023-08-04 RX ORDER — DONEPEZIL HYDROCHLORIDE 5 MG/1
5 TABLET, FILM COATED ORAL NIGHTLY
Qty: 90 TABLET | Refills: 1 | Status: SHIPPED | OUTPATIENT
Start: 2023-08-04

## 2023-08-04 NOTE — TELEPHONE ENCOUNTER
Rx Refill Request Telephone Encounter    Name:  Jossue Tam  :  [id number]  Medication Name:      Amloditina 5 mg   Amitriptyline 25 mg  Alpurinolol 100 mg   Donepezil 5 mg 1 tab every day at bed time - 90 day  Metoclopramide 10 mg    Specific Pharmacy location:  [facility]   Date of last appointment:  2023  Date of next appointment:  na  Best number to reach patient:  [phone]

## 2023-08-04 NOTE — TELEPHONE ENCOUNTER
PC to pt. Metoclopramide was not on med list he states GI in [place] prescribed to help him empty his stomach better and he takes it TID. Set up for your authoriztion. ( Wasn't sure what DX to use for amitriptyline

## 2023-08-22 ENCOUNTER — TELEPHONE (OUTPATIENT)
Dept: PRIMARY CARE | Facility: CLINIC | Age: 78
End: 2023-08-22
Payer: MEDICARE

## 2023-08-22 DIAGNOSIS — B00.9 HERPES SIMPLEX: ICD-10-CM

## 2023-08-22 RX ORDER — ACYCLOVIR 400 MG/1
400 TABLET ORAL 2 TIMES DAILY
Qty: 180 TABLET | Refills: 0 | Status: SHIPPED | OUTPATIENT
Start: 2023-08-22

## 2023-08-22 NOTE — TELEPHONE ENCOUNTER
Rx Refill Request Telephone Encounter    Name:  Jossue ADRIANNE Tam  :  [id number]  Medication Name:    ACYCLOVIR 400MG BID 90 DAY   Specific Pharmacy location:  [facility]   Date of last appointment:  2023  Best number to reach patient:  [phone]

## 2023-08-30 ENCOUNTER — HOSPITAL ENCOUNTER (OUTPATIENT)
Dept: DATA CONVERSION | Facility: HOSPITAL | Age: 78
Discharge: HOME | End: 2023-08-30
Payer: MEDICARE

## 2023-09-11 ENCOUNTER — TELEPHONE (OUTPATIENT)
Dept: PRIMARY CARE | Facility: CLINIC | Age: 78
End: 2023-09-11
Payer: MEDICARE

## 2023-09-11 DIAGNOSIS — E11.59 TYPE 2 DIABETES MELLITUS WITH OTHER CIRCULATORY COMPLICATION, WITH LONG-TERM CURRENT USE OF INSULIN (MULTI): ICD-10-CM

## 2023-09-11 DIAGNOSIS — K21.9 CHRONIC GERD: ICD-10-CM

## 2023-09-11 DIAGNOSIS — Z79.4 TYPE 2 DIABETES MELLITUS WITH OTHER CIRCULATORY COMPLICATION, WITH LONG-TERM CURRENT USE OF INSULIN (MULTI): ICD-10-CM

## 2023-09-11 RX ORDER — SUCRALFATE 1 G/1
1 TABLET ORAL EVERY 12 HOURS
Qty: 180 TABLET | Refills: 1 | Status: SHIPPED | OUTPATIENT
Start: 2023-09-11

## 2023-09-11 RX ORDER — INSULIN GLARGINE 100 [IU]/ML
INJECTION, SOLUTION SUBCUTANEOUS
Qty: 3 ML | Refills: 2 | Status: SHIPPED | OUTPATIENT
Start: 2023-09-11 | End: 2023-10-26 | Stop reason: SDUPTHER

## 2023-09-11 NOTE — TELEPHONE ENCOUNTER
Rx Refill Request Telephone Encounter    Name:  Jossue Tam  :  [id number]  Medication Name:    insulin glargine (Lantus) 100 unit/mL (3 mL) pen - 90 day    sucralfate (Carafate) 1 gram tablet - 1 tab every 12 hrs - 90 day    Specific Pharm[facility] location:   [facility]  Date of last appointment:  2023  Date of next appointment:  na  Best number to reach patient:  [phone]

## 2023-09-20 ENCOUNTER — TELEPHONE (OUTPATIENT)
Dept: PRIMARY CARE | Facility: CLINIC | Age: 78
End: 2023-09-20
Payer: MEDICARE

## 2023-09-20 DIAGNOSIS — I10 PRIMARY HYPERTENSION: ICD-10-CM

## 2023-09-20 DIAGNOSIS — G62.0 PERIPHERAL NEUROPATHY DUE TO CHEMOTHERAPY (MULTI): ICD-10-CM

## 2023-09-20 DIAGNOSIS — T45.1X5A PERIPHERAL NEUROPATHY DUE TO CHEMOTHERAPY (MULTI): ICD-10-CM

## 2023-09-20 NOTE — TELEPHONE ENCOUNTER
Rx Refill Request Telephone Encounter    Name:  Jossue Tam  :  [id number]  Medication Name:    losartan (Cozaar) 100 mg tablet 0.5 tablet every day - 90 day  Rovinirole hydrochioride .25 mg tab 1 tab at bedtime -90 day  Specific Pharmacy location:  [facility]  Date of last appointment:  2023  Date of next appointment:  na  Best number to reach patient:  [phone]

## 2023-09-21 RX ORDER — ROPINIROLE 0.25 MG/1
0.25 TABLET, FILM COATED ORAL 3 TIMES DAILY
Qty: 270 TABLET | Refills: 1 | Status: SHIPPED | OUTPATIENT
Start: 2023-09-21

## 2023-09-21 RX ORDER — LOSARTAN POTASSIUM 100 MG/1
50 TABLET ORAL DAILY
Qty: 90 TABLET | Refills: 2 | Status: SHIPPED | OUTPATIENT
Start: 2023-09-21

## 2023-10-04 ENCOUNTER — OUTSIDE PROCEDURE (OUTPATIENT)
Dept: PAIN MEDICINE | Facility: CLINIC | Age: 78
End: 2023-10-04

## 2023-10-04 ENCOUNTER — ANCILLARY PROCEDURE (OUTPATIENT)
Dept: RADIOLOGY | Facility: CLINIC | Age: 78
End: 2023-10-04
Payer: MEDICARE

## 2023-10-04 DIAGNOSIS — M79.18 MYALGIA, OTHER SITE: ICD-10-CM

## 2023-10-04 DIAGNOSIS — M46.1 SACROILIITIS, NOT ELSEWHERE CLASSIFIED (CMS-HCC): ICD-10-CM

## 2023-10-04 PROCEDURE — 77003 FLUOROGUIDE FOR SPINE INJECT: CPT | Mod: RSC

## 2023-10-12 ENCOUNTER — OFFICE VISIT (OUTPATIENT)
Dept: PRIMARY CARE | Facility: CLINIC | Age: 78
End: 2023-10-12
Payer: MEDICARE

## 2023-10-12 VITALS
HEART RATE: 83 BPM | HEIGHT: 66 IN | BODY MASS INDEX: 34.84 KG/M2 | WEIGHT: 216.8 LBS | OXYGEN SATURATION: 96 % | SYSTOLIC BLOOD PRESSURE: 118 MMHG | DIASTOLIC BLOOD PRESSURE: 72 MMHG

## 2023-10-12 DIAGNOSIS — Z23 NEED FOR VACCINATION: ICD-10-CM

## 2023-10-12 DIAGNOSIS — E11.59 TYPE 2 DIABETES MELLITUS WITH OTHER CIRCULATORY COMPLICATION, WITH LONG-TERM CURRENT USE OF INSULIN (MULTI): ICD-10-CM

## 2023-10-12 DIAGNOSIS — Z79.4 TYPE 2 DIABETES MELLITUS WITH OTHER CIRCULATORY COMPLICATION, WITH LONG-TERM CURRENT USE OF INSULIN (MULTI): ICD-10-CM

## 2023-10-12 DIAGNOSIS — L30.1 DYSHIDROTIC ECZEMA: Primary | ICD-10-CM

## 2023-10-12 LAB — POC HEMOGLOBIN A1C: 7.5 % (ref 4.2–6.5)

## 2023-10-12 PROCEDURE — 99214 OFFICE O/P EST MOD 30 MIN: CPT | Performed by: NURSE PRACTITIONER

## 2023-10-12 PROCEDURE — 1036F TOBACCO NON-USER: CPT | Performed by: NURSE PRACTITIONER

## 2023-10-12 PROCEDURE — 3074F SYST BP LT 130 MM HG: CPT | Performed by: NURSE PRACTITIONER

## 2023-10-12 PROCEDURE — 90662 IIV NO PRSV INCREASED AG IM: CPT | Performed by: NURSE PRACTITIONER

## 2023-10-12 PROCEDURE — 1125F AMNT PAIN NOTED PAIN PRSNT: CPT | Performed by: NURSE PRACTITIONER

## 2023-10-12 PROCEDURE — 1159F MED LIST DOCD IN RCRD: CPT | Performed by: NURSE PRACTITIONER

## 2023-10-12 PROCEDURE — 1160F RVW MEDS BY RX/DR IN RCRD: CPT | Performed by: NURSE PRACTITIONER

## 2023-10-12 PROCEDURE — G0008 ADMIN INFLUENZA VIRUS VAC: HCPCS | Performed by: NURSE PRACTITIONER

## 2023-10-12 PROCEDURE — 3078F DIAST BP <80 MM HG: CPT | Performed by: NURSE PRACTITIONER

## 2023-10-12 PROCEDURE — 83036 HEMOGLOBIN GLYCOSYLATED A1C: CPT | Performed by: NURSE PRACTITIONER

## 2023-10-12 RX ORDER — TRIAMCINOLONE ACETONIDE 1 MG/G
CREAM TOPICAL 2 TIMES DAILY
Qty: 15 G | Refills: 0 | Status: SHIPPED | OUTPATIENT
Start: 2023-10-12

## 2023-10-12 ASSESSMENT — ENCOUNTER SYMPTOMS
NEUROLOGICAL NEGATIVE: 1
GASTROINTESTINAL NEGATIVE: 1
CARDIOVASCULAR NEGATIVE: 1
RESPIRATORY NEGATIVE: 1
CONSTITUTIONAL NEGATIVE: 1
PSYCHIATRIC NEGATIVE: 1
BACK PAIN: 1

## 2023-10-12 ASSESSMENT — COLUMBIA-SUICIDE SEVERITY RATING SCALE - C-SSRS
1. IN THE PAST MONTH, HAVE YOU WISHED YOU WERE DEAD OR WISHED YOU COULD GO TO SLEEP AND NOT WAKE UP?: NO
6. HAVE YOU EVER DONE ANYTHING, STARTED TO DO ANYTHING, OR PREPARED TO DO ANYTHING TO END YOUR LIFE?: NO
2. HAVE YOU ACTUALLY HAD ANY THOUGHTS OF KILLING YOURSELF?: NO

## 2023-10-12 NOTE — PROGRESS NOTES
"Subjective   Patient ID: Jossue Tam is a 78 y.o. male who presents for Follow-up (4 month).    DM: Blood sugars have been around 135-170. A1C 7.5% today. Blood sugar went up to 310 a few weeks ago, eating foods in [place]/sugary foods.   HTN: BP stable on 118/72. Still feels he is dizzy when he stands up or bends over.   Depression: Mood been ok. Stable on medication.   HLD: Stable on medication  GERD:  Stable on medication  Lymphoma: Followed by oncology. Saw him recently.  Skin: Patient has skin lesions.   Flu vaccine given today in office.   Back Pain: got the steroid injections 1.5 weeks ago. Back pain has improved.        Review of Systems   Constitutional: Negative.    HENT: Negative.     Respiratory: Negative.     Cardiovascular: Negative.    Gastrointestinal: Negative.    Genitourinary: Negative.    Musculoskeletal:  Positive for back pain.   Neurological: Negative.    Psychiatric/Behavioral: Negative.         Objective   /72   Pulse 83   Ht 1.676 m (5' 6\")   Wt 98.3 kg (216 lb 12.8 oz)   SpO2 96%   BMI 34.99 kg/m²     Physical Exam  Vitals reviewed.   Constitutional:       Appearance: Normal appearance.   HENT:      Head: Normocephalic.   Cardiovascular:      Rate and Rhythm: Normal rate and regular rhythm.      Heart sounds: Normal heart sounds.   Pulmonary:      Effort: Pulmonary effort is normal.      Breath sounds: Normal breath sounds.   Skin:     General: Skin is warm.      Findings: Lesion present.      Comments: Multiple erythematous lesions on bilateral hands. Not painful or itchy.    Neurological:      General: No focal deficit present.      Mental Status: He is alert and oriented to person, place, and time.   Psychiatric:         Mood and Affect: Mood normal.         Behavior: Behavior normal.         Thought Content: Thought content normal.         Judgment: Judgment normal.       Assessment/Plan   Problem List Items Addressed This Visit             ICD-10-CM    Diabetes mellitus, " type 2 (CMS/HCC) E11.9    Relevant Orders    POCT glycosylated hemoglobin (Hb A1C) manually resulted (Completed)     Other Visit Diagnoses         Codes    Dyshidrotic eczema    -  Primary L30.1    Relevant Medications    triamcinolone (Kenalog) 0.1 % cream    Need for vaccination     Z23    Relevant Orders    Flu vaccine, quadrivalent, high-dose, preservative free, age 65y+ (FLUZONE)          Reviewed with patient follow-up in 6 months sooner if needed they are going back to [place] and they have established providers there and will follow back up here when they return

## 2023-10-13 NOTE — PROGRESS NOTES
This note has been moved to another encounter or patient during the course of a chart correction case.  If you need access to the original text of this note, please contact the Health Information Management department.

## 2023-10-18 ENCOUNTER — TELEPHONE (OUTPATIENT)
Dept: PRIMARY CARE | Facility: CLINIC | Age: 78
End: 2023-10-18
Payer: MEDICARE

## 2023-10-18 DIAGNOSIS — E78.5 HYPERLIPIDEMIA, UNSPECIFIED HYPERLIPIDEMIA TYPE: ICD-10-CM

## 2023-10-19 RX ORDER — ICOSAPENT ETHYL 1 G/1
CAPSULE ORAL
Qty: 360 CAPSULE | Refills: 1 | Status: SHIPPED | OUTPATIENT
Start: 2023-10-19

## 2023-10-26 ENCOUNTER — TELEPHONE (OUTPATIENT)
Dept: PRIMARY CARE | Facility: CLINIC | Age: 78
End: 2023-10-26
Payer: MEDICARE

## 2023-10-26 DIAGNOSIS — Z79.4 TYPE 2 DIABETES MELLITUS WITH OTHER CIRCULATORY COMPLICATION, WITH LONG-TERM CURRENT USE OF INSULIN (MULTI): ICD-10-CM

## 2023-10-26 DIAGNOSIS — E11.59 TYPE 2 DIABETES MELLITUS WITH OTHER CIRCULATORY COMPLICATION, WITH LONG-TERM CURRENT USE OF INSULIN (MULTI): ICD-10-CM

## 2023-10-26 NOTE — TELEPHONE ENCOUNTER
Pt calling states he is out of refills for his Lantus Solostar insulin pen. He states he only gets 1 pen at a time so he has to get more pens at a time. Please send in a 90 day supply order.     BK Lr

## 2023-10-27 RX ORDER — INSULIN GLARGINE 100 [IU]/ML
INJECTION, SOLUTION SUBCUTANEOUS
Qty: 3 EACH | Refills: 2 | Status: SHIPPED | OUTPATIENT
Start: 2023-10-27

## 2024-06-14 ENCOUNTER — APPOINTMENT (OUTPATIENT)
Dept: PRIMARY CARE | Facility: CLINIC | Age: 79
End: 2024-06-14
Payer: MEDICARE

## 2024-06-14 VITALS
DIASTOLIC BLOOD PRESSURE: 70 MMHG | OXYGEN SATURATION: 98 % | BODY MASS INDEX: 35.39 KG/M2 | SYSTOLIC BLOOD PRESSURE: 112 MMHG | HEIGHT: 66 IN | HEART RATE: 75 BPM | WEIGHT: 220.2 LBS

## 2024-06-14 DIAGNOSIS — N40.0 BENIGN PROSTATIC HYPERPLASIA WITHOUT LOWER URINARY TRACT SYMPTOMS: ICD-10-CM

## 2024-06-14 DIAGNOSIS — T45.1X5A PERIPHERAL NEUROPATHY DUE TO CHEMOTHERAPY (MULTI): ICD-10-CM

## 2024-06-14 DIAGNOSIS — Z86.010 HISTORY OF COLON POLYPS: ICD-10-CM

## 2024-06-14 DIAGNOSIS — E78.5 HYPERLIPIDEMIA, UNSPECIFIED HYPERLIPIDEMIA TYPE: ICD-10-CM

## 2024-06-14 DIAGNOSIS — G62.0 PERIPHERAL NEUROPATHY DUE TO CHEMOTHERAPY (MULTI): ICD-10-CM

## 2024-06-14 DIAGNOSIS — E11.59 TYPE 2 DIABETES MELLITUS WITH OTHER CIRCULATORY COMPLICATION, WITH LONG-TERM CURRENT USE OF INSULIN (MULTI): ICD-10-CM

## 2024-06-14 DIAGNOSIS — E55.9 VITAMIN D DEFICIENCY: ICD-10-CM

## 2024-06-14 DIAGNOSIS — R01.1 CARDIAC MURMUR: ICD-10-CM

## 2024-06-14 DIAGNOSIS — C83.30 DIFFUSE LARGE B-CELL LYMPHOMA, UNSPECIFIED BODY REGION (MULTI): ICD-10-CM

## 2024-06-14 DIAGNOSIS — Z00.00 ROUTINE ADULT HEALTH MAINTENANCE: ICD-10-CM

## 2024-06-14 DIAGNOSIS — I10 PRIMARY HYPERTENSION: ICD-10-CM

## 2024-06-14 DIAGNOSIS — M51.36 DDD (DEGENERATIVE DISC DISEASE), LUMBAR: Primary | ICD-10-CM

## 2024-06-14 DIAGNOSIS — Z79.4 TYPE 2 DIABETES MELLITUS WITH OTHER CIRCULATORY COMPLICATION, WITH LONG-TERM CURRENT USE OF INSULIN (MULTI): ICD-10-CM

## 2024-06-14 PROBLEM — Z86.0100 HISTORY OF COLON POLYPS: Status: ACTIVE | Noted: 2024-06-14

## 2024-06-14 LAB
POC ALBUMIN /CREATININE RATIO MANUALLY ENTERED: ABNORMAL UG/MG CREAT
POC HEMOGLOBIN A1C: 7 % (ref 4.2–6.5)
POC URINE ALBUMIN: 80 MG/L
POC URINE CREATININE: 100 MG/DL

## 2024-06-14 PROCEDURE — 1160F RVW MEDS BY RX/DR IN RCRD: CPT | Performed by: NURSE PRACTITIONER

## 2024-06-14 PROCEDURE — 82044 UR ALBUMIN SEMIQUANTITATIVE: CPT | Performed by: NURSE PRACTITIONER

## 2024-06-14 PROCEDURE — 1036F TOBACCO NON-USER: CPT | Performed by: NURSE PRACTITIONER

## 2024-06-14 PROCEDURE — 80354 DRUG SCREENING FENTANYL: CPT

## 2024-06-14 PROCEDURE — 80365 DRUG SCREENING OXYCODONE: CPT

## 2024-06-14 PROCEDURE — 80346 BENZODIAZEPINES1-12: CPT

## 2024-06-14 PROCEDURE — 80358 DRUG SCREENING METHADONE: CPT

## 2024-06-14 PROCEDURE — 1170F FXNL STATUS ASSESSED: CPT | Performed by: NURSE PRACTITIONER

## 2024-06-14 PROCEDURE — 80307 DRUG TEST PRSMV CHEM ANLYZR: CPT

## 2024-06-14 PROCEDURE — 99214 OFFICE O/P EST MOD 30 MIN: CPT | Performed by: NURSE PRACTITIONER

## 2024-06-14 PROCEDURE — 80373 DRUG SCREENING TRAMADOL: CPT

## 2024-06-14 PROCEDURE — G0439 PPPS, SUBSEQ VISIT: HCPCS | Performed by: NURSE PRACTITIONER

## 2024-06-14 PROCEDURE — 83036 HEMOGLOBIN GLYCOSYLATED A1C: CPT | Performed by: NURSE PRACTITIONER

## 2024-06-14 PROCEDURE — 3074F SYST BP LT 130 MM HG: CPT | Performed by: NURSE PRACTITIONER

## 2024-06-14 PROCEDURE — 80361 OPIATES 1 OR MORE: CPT

## 2024-06-14 PROCEDURE — 36415 COLL VENOUS BLD VENIPUNCTURE: CPT

## 2024-06-14 PROCEDURE — 1157F ADVNC CARE PLAN IN RCRD: CPT | Performed by: NURSE PRACTITIONER

## 2024-06-14 PROCEDURE — 80368 SEDATIVE HYPNOTICS: CPT

## 2024-06-14 PROCEDURE — 82570 ASSAY OF URINE CREATININE: CPT

## 2024-06-14 PROCEDURE — 1159F MED LIST DOCD IN RCRD: CPT | Performed by: NURSE PRACTITIONER

## 2024-06-14 PROCEDURE — 3078F DIAST BP <80 MM HG: CPT | Performed by: NURSE PRACTITIONER

## 2024-06-14 RX ORDER — DONEPEZIL HYDROCHLORIDE 10 MG/1
10 TABLET, FILM COATED ORAL NIGHTLY
COMMUNITY
Start: 2024-05-27

## 2024-06-14 RX ORDER — HYDROCODONE BITARTRATE AND ACETAMINOPHEN 10; 325 MG/1; MG/1
1 TABLET ORAL EVERY 6 HOURS PRN
Qty: 120 TABLET | Refills: 0 | Status: SHIPPED | OUTPATIENT
Start: 2024-06-14 | End: 2024-07-14

## 2024-06-14 RX ORDER — TAMSULOSIN HYDROCHLORIDE 0.4 MG/1
0.8 CAPSULE ORAL DAILY
Qty: 90 CAPSULE | Refills: 2 | Status: SHIPPED | OUTPATIENT
Start: 2024-06-14

## 2024-06-14 RX ORDER — PREGABALIN 300 MG/1
300 CAPSULE ORAL 2 TIMES DAILY
Qty: 180 CAPSULE | Refills: 0 | Status: SHIPPED | OUTPATIENT
Start: 2024-06-14 | End: 2024-09-12

## 2024-06-14 RX ORDER — AMITRIPTYLINE HYDROCHLORIDE 25 MG/1
25 TABLET, FILM COATED ORAL NIGHTLY
COMMUNITY
Start: 2024-05-27 | End: 2024-06-14 | Stop reason: WASHOUT

## 2024-06-14 ASSESSMENT — ENCOUNTER SYMPTOMS
SLEEP DISTURBANCE: 1
HEADACHES: 0
NAUSEA: 0
OCCASIONAL FEELINGS OF UNSTEADINESS: 0
DIZZINESS: 0
SHORTNESS OF BREATH: 0
FEVER: 0
JOINT SWELLING: 0
WOUND: 0
SEIZURES: 0
ADENOPATHY: 0
EYE PAIN: 0
DIFFICULTY URINATING: 0
MYALGIAS: 0
COUGH: 0
COLOR CHANGE: 0
ABDOMINAL PAIN: 0
DEPRESSION: 0
NUMBNESS: 1
TROUBLE SWALLOWING: 0
DIARRHEA: 0
WEAKNESS: 0
ABDOMINAL DISTENTION: 0
PALPITATIONS: 0
CONSTIPATION: 0
WHEEZING: 0
FATIGUE: 0
CHILLS: 0
BRUISES/BLEEDS EASILY: 0
LOSS OF SENSATION IN FEET: 0

## 2024-06-14 ASSESSMENT — COLUMBIA-SUICIDE SEVERITY RATING SCALE - C-SSRS
2. HAVE YOU ACTUALLY HAD ANY THOUGHTS OF KILLING YOURSELF?: NO
1. IN THE PAST MONTH, HAVE YOU WISHED YOU WERE DEAD OR WISHED YOU COULD GO TO SLEEP AND NOT WAKE UP?: NO
6. HAVE YOU EVER DONE ANYTHING, STARTED TO DO ANYTHING, OR PREPARED TO DO ANYTHING TO END YOUR LIFE?: NO

## 2024-06-14 ASSESSMENT — ACTIVITIES OF DAILY LIVING (ADL)
TAKING_MEDICATION: INDEPENDENT
DRESSING: INDEPENDENT
MANAGING_FINANCES: INDEPENDENT
BATHING: INDEPENDENT
GROCERY_SHOPPING: INDEPENDENT
DOING_HOUSEWORK: INDEPENDENT

## 2024-06-14 ASSESSMENT — PATIENT HEALTH QUESTIONNAIRE - PHQ9
2. FEELING DOWN, DEPRESSED OR HOPELESS: NOT AT ALL
1. LITTLE INTEREST OR PLEASURE IN DOING THINGS: NOT AT ALL
SUM OF ALL RESPONSES TO PHQ9 QUESTIONS 1 AND 2: 0

## 2024-06-14 NOTE — ASSESSMENT & PLAN NOTE
Echo ordered today for monitoring purposes of murmur.  Will send some results to patient's cardiologist, Dr. Pleitez.  Patient to notify provider for any new cardiac concerns.

## 2024-06-14 NOTE — ASSESSMENT & PLAN NOTE
Gastroenterologist referral placed today in order to assess need for additional colonoscopies given patient age of 78

## 2024-06-14 NOTE — ASSESSMENT & PLAN NOTE
Blood pressure stable on amlodipine and Cozaar.  Will continue to monitor patient's blood pressure at subsequent office visits.  He is to maintain routine follow-up with cardiology.  Provider to be notified of any new cardiac concerns.

## 2024-06-14 NOTE — ASSESSMENT & PLAN NOTE
Lab Results   Component Value Date    HGBA1C 7.0 (A) 06/14/2024      Patient to continue current diabetic regiment.  He remains on Jardiance daily, twice daily metformin with once daily insulin dosing.  Patient to notify provider for any persistent issues with hypo or hyperglycemia.  He is to continue to allow to use continuous glucose monitor to assist with tighter blood glucose control.   MVP 3.12cm

## 2024-06-14 NOTE — ASSESSMENT & PLAN NOTE
Patient to maintain routine follow-up with oncology for continued evaluation and treatment recommendations.

## 2024-06-14 NOTE — PROGRESS NOTES
Subjective   Patient ID: Jossue Tam is a 78 y.o. male who presents for Medicare Annual Wellness Visit Subsequent and Follow-up (Med review).    Patient seen today for routine follow-up and Medicare annual wellness exam.  Patient seen sitting up in room, in no acute distress.  He is alert, oriented and appears in fair spirits.  Patient and spouse recently returned back to [place] from [place].  They divide their time fairly evenly between the 2 states so they can visit with all of their family members.  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  He is currently taking Lyrica and is agreeable for dose adjustment.  Patient also receives Norco routinely for chronic back issues.  Patient also follows routinely with pain management for additional treatment recommendations.  Patient follows routinely with oncology for non-Hodgkin's lymphoma.  He appears to be doing well after stage IV cancer diagnosis.  Patient denies any mood concerns but does admit to poor quality sleep.  He does have obstructive sleep apnea but did not bring his CPAP home with him from [place].  Patient feels like he has been sleeping longer hours but denies any increased fatigue during the day.  Patient denies any issues with shortness of breath or chest pain.  Patient is a diabetic and utilizes a continuous glucose monitor.  He states that his readings typically range from between 100 - 225.  He denies any episodes of hypoglycemia but does have an occasional hyperglycemic episode.  Patient states that he does his best to follow a diabetic diet.  No reported issues with appetite or staying hydrated.  Patient admits to intermittent issues with diarrhea secondary to eating fruits.  He denies any associated abdominal pain or cramping.  Patient has a history of colon polyps and is agreeable for gastroenterology follow-up for recommendations regarding screening.  Patient  has a history of hypertension for which she follows with cardiology for.  He denies any issues with chest pain, shortness of breath, headaches, blurred vision or other cardiac concerns.  Patient wears glasses for reading and denies any headaches or blurred vision.  No dental concerns reported at this time.  Medications and chart reviewed.  No other acute concerns voiced at this time.         Current Outpatient Medications on File Prior to Visit   Medication Sig Dispense Refill    acyclovir (Zovirax) 400 mg tablet Take 1 tablet (400 mg) by mouth 2 times a day. 180 tablet 0    allopurinol (Zyloprim) 100 mg tablet Take 1 tablet (100 mg) by mouth once daily. 90 tablet 1    amLODIPine (Norvasc) 5 mg tablet Take 1 tablet (5 mg) by mouth once daily. 90 tablet 1    cholecalciferol (Vitamin D-3) 50 MCG (2000 UT) tablet Take 1 tablet (2,000 Units) by mouth once daily.      cyanocobalamin, vitamin B-12, 2,500 mcg tablet, sublingual TAKE AS DIRECTED.      donepezil (Aricept) 10 mg tablet Take 1 tablet (10 mg) by mouth once daily at bedtime.      DULoxetine (Cymbalta) 60 mg DR capsule Take 1 capsule (60 mg) by mouth once daily. 90 capsule 1    empagliflozin (Jardiance) 25 mg Take 1 tablet (25 mg) by mouth once daily. 90 tablet 1    FreeStyle Yo sensor system (FreeStyle Yo 14 Day Sensor) kit Inject 2 each under the skin once daily. Use as instructed 1 each 2    ibuprofen 800 mg tablet Take 1 tablet (800 mg) by mouth 3 times a day as needed for moderate pain (4 - 6). 90 tablet 0    icosapent ethyL (Vascepa) 1 gram capsule TAKE 4 CAPSULES BY MOUTH EVERY DAY AS DIRECTED 360 capsule 1    insulin glargine (Lantus) 100 unit/mL (3 mL) pen inject 25 units under the skin once daily at bedtime 3 each 2    lidocaine (Lidoderm) 5 % patch see administration instructions. Apply as directed by physician      losartan (Cozaar) 100 mg tablet Take 0.5 tablets (50 mg) by mouth once daily. 90 tablet 2    metFORMIN (Glucophage) 1,000 mg tablet  Take 1 tablet (1,000 mg) by mouth 2 times a day with meals. 180 tablet 2    methocarbamol (Robaxin) 750 mg tablet Take 1 tablet (750 mg) by mouth 3 times a day as needed for muscle spasms. 270 tablet 1    metoclopramide (Reglan) 10 mg tablet Take 0.5 tablets (5 mg) by mouth 3 times a day. 270 tablet 1    multivit-iron-minerals-folic acid (Centrum Silver) 0.4 mg-300 mcg- 250 mcg tab Take 1 tablet by mouth once daily.      naloxone (Narcan) 4 mg/0.1 mL nasal spray Administer 1 spray (4 mg) into affected nostril(s) if needed for opioid reversal. May repeat every 2-3 minutes if needed, alternating nostrils, until medical assistance becomes available. 2 each 0    pantoprazole (ProtoNix) 40 mg EC tablet 1 pill daily 90 tablet 1    rOPINIRole (Requip) 0.25 mg tablet Take 1 tablet (0.25 mg) by mouth 3 times a day. 270 tablet 1    simvastatin (Zocor) 10 mg tablet Take 1 tablet (10 mg) by mouth once daily. 90 tablet 2    sucralfate (Carafate) 1 gram tablet Take 1 tablet (1 g) by mouth every 12 hours. 180 tablet 1    triamcinolone (Kenalog) 0.1 % cream Apply topically 2 times a day. Apply to affected area 1-2 times daily as needed. 15 g 0    [DISCONTINUED] amitriptyline (Elavil) 25 mg tablet Take 1 tablet (25 mg) by mouth once daily at bedtime.      [DISCONTINUED] HYDROcodone-acetaminophen (Norco)  mg tablet Take 1 tablet by mouth every 6 hours if needed.      [DISCONTINUED] tamsulosin (Flomax) 0.4 mg 24 hr capsule Take 2 capsules (0.8 mg) by mouth once daily. 90 capsule 2    amitriptyline (Elavil) 100 mg tablet Take 0.5 tablets (50 mg) by mouth once daily at bedtime. 45 tablet 1    dulaglutide (Trulicity) 3 mg/0.5 mL pen injector Inject 3 mg under the skin 1 (one) time per week. 3 mL 1    [DISCONTINUED] amoxicillin (Amoxil) 500 mg tablet TAKE 4 TABLETS BY MOUTH 1 HOUR PRIOR TO APPOINTMENT.      [DISCONTINUED] donepezil (Aricept) 5 mg tablet Take 1 tablet (5 mg) by mouth once daily at bedtime. 90 tablet 1     "[DISCONTINUED] pregabalin (Lyrica) 225 mg capsule Take 1 capsule (225 mg) by mouth 2 times a day. 180 capsule 1     No current facility-administered medications on file prior to visit.       Past Medical History:   Diagnosis Date    Back abscess 05/30/2023    Hallucinations, visual 05/30/2023        Past Surgical History:   Procedure Laterality Date    OTHER SURGICAL HISTORY  06/24/2021    Ankle surgery        No family history on file.     Review of Systems   Constitutional:  Negative for chills, fatigue and fever.   HENT:  Positive for hearing loss. Negative for dental problem and trouble swallowing.    Eyes:  Negative for pain and visual disturbance.        Wears glasses for reading   Respiratory:  Negative for cough, shortness of breath and wheezing.    Cardiovascular:  Negative for chest pain, palpitations and leg swelling.   Gastrointestinal:  Negative for abdominal distention, abdominal pain, constipation, diarrhea and nausea.   Endocrine: Negative for cold intolerance and heat intolerance.   Genitourinary:  Negative for difficulty urinating.   Musculoskeletal:  Negative for gait problem, joint swelling and myalgias.   Skin:  Negative for color change, pallor, rash and wound.   Allergic/Immunologic: Negative for environmental allergies and food allergies.   Neurological:  Positive for numbness. Negative for dizziness, seizures, weakness and headaches.        Neuropathy in hands secondary to chemo; neuropathy in feet secondary to diabetes   Hematological:  Negative for adenopathy. Does not bruise/bleed easily.   Psychiatric/Behavioral:  Positive for sleep disturbance. Negative for behavioral problems.         Positive for poor quality sleep   All other systems reviewed and are negative.      Objective   /70   Pulse 75   Ht 1.676 m (5' 6\")   Wt 99.9 kg (220 lb 3.2 oz)   SpO2 98%   BMI 35.54 kg/m²     Physical Exam  Constitutional:       General: He is not in acute distress.     Appearance: Normal " appearance. He is not toxic-appearing.   HENT:      Head: Normocephalic and atraumatic.      Right Ear: Tympanic membrane, ear canal and external ear normal.      Left Ear: Tympanic membrane, ear canal and external ear normal.      Nose: Nose normal.      Mouth/Throat:      Mouth: Mucous membranes are moist.      Pharynx: Oropharynx is clear.   Eyes:      Extraocular Movements: Extraocular movements intact.      Conjunctiva/sclera: Conjunctivae normal.      Pupils: Pupils are equal, round, and reactive to light.   Cardiovascular:      Rate and Rhythm: Normal rate and regular rhythm.      Pulses: Normal pulses.      Heart sounds: Murmur heard.   Pulmonary:      Effort: Pulmonary effort is normal.      Breath sounds: Normal breath sounds. No wheezing.   Abdominal:      General: Bowel sounds are normal.      Palpations: Abdomen is soft.   Musculoskeletal:         General: No swelling.      Cervical back: Normal range of motion and neck supple.   Skin:     General: Skin is warm and dry.   Neurological:      General: No focal deficit present.      Mental Status: He is alert and oriented to person, place, and time. Mental status is at baseline.      Cranial Nerves: No cranial nerve deficit.      Motor: No weakness.   Psychiatric:         Mood and Affect: Mood normal.         Behavior: Behavior normal.         Thought Content: Thought content normal.         Judgment: Judgment normal.       Assessment/Plan   Problem List Items Addressed This Visit             ICD-10-CM    Diabetes mellitus, type 2 (Multi) E11.9     Lab Results   Component Value Date    HGBA1C 7.0 (A) 06/14/2024      Patient to continue current diabetic regiment.  He remains on Jardiance daily, twice daily metformin with once daily insulin dosing.  Patient to notify provider for any persistent issues with hypo or hyperglycemia.  He is to continue to allow to use continuous glucose monitor to assist with tighter blood glucose control.         Relevant Orders     POCT glycosylated hemoglobin (Hb A1C) manually resulted (Completed)    POCT Albumin Random Urine manually resulted (Completed)    CBC and Auto Differential    Comprehensive Metabolic Panel    Hyperlipidemia E78.5     Patient continues on Zocor.  Lipid panel ordered today for monitoring purposes.         Hypertension I10     Blood pressure stable on amlodipine and Cozaar.  Will continue to monitor patient's blood pressure at subsequent office visits.  He is to maintain routine follow-up with cardiology.  Provider to be notified of any new cardiac concerns.         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  Patient to schedule follow-up with pain management for additional treatment recommendations.         Relevant Medications    pregabalin (Lyrica) 300 mg capsule    Other Relevant Orders    Opiate/Opioid/Benzo Prescription Compliance    Drug Screen, Urine With Reflex to Confirmation    Diffuse large B-cell lymphoma, unspecified body region (Multi) C83.30     Patient to maintain routine follow-up with oncology for continued evaluation and treatment recommendations.         DDD (degenerative disc disease), lumbar - Primary M51.36     Norco reordered.  Opioid consent and urine completed today         Relevant Medications    HYDROcodone-acetaminophen (Norco)  mg tablet    Other Relevant Orders    Opiate/Opioid/Benzo Prescription Compliance    Drug Screen, Urine With Reflex to Confirmation    VALENTINO with Reflex to FELI    Sedimentation Rate    C-Reactive Protein    Vitamin D deficiency E55.9    Relevant Orders    Vitamin D 25-Hydroxy,Total (for eval of Vitamin D levels)    Cardiac murmur R01.1     Echo ordered today for monitoring purposes of murmur.  Will send some results to patient's cardiologist, Dr. Pleitez.  Patient to notify provider for any new cardiac concerns.         Relevant Orders    Transthoracic Echo (TTE) Complete    Routine adult health  maintenance Z00.00     Routine labs ordered today for monitoring purposes         Relevant Orders    CBC and Auto Differential    Comprehensive Metabolic Panel    Lipid Panel    Vitamin D 25-Hydroxy,Total (for eval of Vitamin D levels)    TSH with reflex to Free T4 if abnormal    Prostate Specific Antigen    VALENTINO with Reflex to FELI    Sedimentation Rate    C-Reactive Protein    History of colon polyps Z86.010     Gastroenterologist referral placed today in order to assess need for additional colonoscopies given patient age of 78         Relevant Orders    Referral to Gastroenterology     Other Visit Diagnoses         Codes    Benign prostatic hyperplasia without lower urinary tract symptoms     N40.0    Relevant Medications    tamsulosin (Flomax) 0.4 mg 24 hr capsule

## 2024-06-14 NOTE — ASSESSMENT & PLAN NOTE
Will increase Lyrica dosing.  Patient to schedule follow-up with pain management for additional treatment recommendations.

## 2024-06-15 LAB
AMPHETAMINES UR QL SCN: NORMAL
BARBITURATES UR QL SCN: NORMAL
BZE UR QL SCN: NORMAL
CANNABINOIDS UR QL SCN: NORMAL
CREAT UR-MCNC: 68.7 MG/DL (ref 20–370)
PCP UR QL SCN: NORMAL

## 2024-06-20 LAB
1OH-MIDAZOLAM UR CFM-MCNC: <25 NG/ML
6MAM UR CFM-MCNC: <25 NG/ML
7AMINOCLONAZEPAM UR CFM-MCNC: <25 NG/ML
A-OH ALPRAZ UR CFM-MCNC: <25 NG/ML
ALPRAZ UR CFM-MCNC: <25 NG/ML
CHLORDIAZEP UR CFM-MCNC: <25 NG/ML
CLONAZEPAM UR CFM-MCNC: <25 NG/ML
CODEINE UR CFM-MCNC: <50 NG/ML
DIAZEPAM UR CFM-MCNC: <25 NG/ML
EDDP UR CFM-MCNC: <25 NG/ML
FENTANYL UR CFM-MCNC: <2.5 NG/ML
HYDROCODONE CTO UR CFM-MCNC: 1736 NG/ML
HYDROMORPHONE UR CFM-MCNC: 152 NG/ML
LORAZEPAM UR CFM-MCNC: <25 NG/ML
METHADONE UR CFM-MCNC: <25 NG/ML
MIDAZOLAM UR CFM-MCNC: <25 NG/ML
MORPHINE UR CFM-MCNC: <50 NG/ML
NORDIAZEPAM UR CFM-MCNC: <25 NG/ML
NORFENTANYL UR CFM-MCNC: <2.5 NG/ML
NORHYDROCODONE UR CFM-MCNC: 685 NG/ML
NOROXYCODONE UR CFM-MCNC: <25 NG/ML
NORTRAMADOL UR-MCNC: <50 NG/ML
OXAZEPAM UR CFM-MCNC: <25 NG/ML
OXYCODONE UR CFM-MCNC: <25 NG/ML
OXYMORPHONE UR CFM-MCNC: <25 NG/ML
TEMAZEPAM UR CFM-MCNC: <25 NG/ML
TRAMADOL UR CFM-MCNC: <50 NG/ML
ZOLPIDEM UR CFM-MCNC: <25 NG/ML
ZOLPIDEM UR-MCNC: <25 NG/ML

## 2024-07-01 ENCOUNTER — TELEPHONE (OUTPATIENT)
Dept: PRIMARY CARE | Facility: CLINIC | Age: 79
End: 2024-07-01
Payer: MEDICARE

## 2024-07-01 DIAGNOSIS — T45.1X5A PERIPHERAL NEUROPATHY DUE TO CHEMOTHERAPY (MULTI): ICD-10-CM

## 2024-07-01 DIAGNOSIS — G62.0 PERIPHERAL NEUROPATHY DUE TO CHEMOTHERAPY (MULTI): ICD-10-CM

## 2024-07-01 DIAGNOSIS — E78.5 HYPERLIPIDEMIA, UNSPECIFIED HYPERLIPIDEMIA TYPE: ICD-10-CM

## 2024-07-01 RX ORDER — ROPINIROLE 0.25 MG/1
0.25 TABLET, FILM COATED ORAL 3 TIMES DAILY
Qty: 270 TABLET | Refills: 1 | Status: SHIPPED | OUTPATIENT
Start: 2024-07-01

## 2024-07-01 RX ORDER — SIMVASTATIN 10 MG/1
10 TABLET, FILM COATED ORAL DAILY
Qty: 90 TABLET | Refills: 2 | Status: SHIPPED | OUTPATIENT
Start: 2024-07-01

## 2024-07-01 NOTE — TELEPHONE ENCOUNTER
Rx Refill Request Telephone Encounter    Name:  Jossue Tam  :  [id number]  Medication Name:    SIMVASTATIN 10MG Q/D 90 DAY   ROPINIROLE 25MG BID   Specific Pharmacy location:  [facility]    Date of last appointment:  2024  Date of next appointment:  2024  Best number to reach patient:  [phone]

## 2024-07-11 ENCOUNTER — OFFICE VISIT (OUTPATIENT)
Dept: PAIN MEDICINE | Facility: CLINIC | Age: 79
End: 2024-07-11
Payer: MEDICARE

## 2024-07-11 VITALS
SYSTOLIC BLOOD PRESSURE: 112 MMHG | HEART RATE: 81 BPM | DIASTOLIC BLOOD PRESSURE: 74 MMHG | BODY MASS INDEX: 35.36 KG/M2 | OXYGEN SATURATION: 96 % | RESPIRATION RATE: 16 BRPM | HEIGHT: 66 IN | WEIGHT: 220 LBS

## 2024-07-11 DIAGNOSIS — M54.16 LUMBAR RADICULOPATHY: ICD-10-CM

## 2024-07-11 DIAGNOSIS — G89.29 OTHER CHRONIC PAIN: ICD-10-CM

## 2024-07-11 DIAGNOSIS — M48.062 SPINAL STENOSIS, LUMBAR REGION, WITH NEUROGENIC CLAUDICATION: Primary | ICD-10-CM

## 2024-07-11 DIAGNOSIS — M46.1 SACROILIITIS (CMS-HCC): ICD-10-CM

## 2024-07-11 DIAGNOSIS — G62.0 CHEMOTHERAPY-INDUCED NEUROPATHY (MULTI): ICD-10-CM

## 2024-07-11 DIAGNOSIS — T45.1X5A CHEMOTHERAPY-INDUCED NEUROPATHY (MULTI): ICD-10-CM

## 2024-07-11 PROCEDURE — 3074F SYST BP LT 130 MM HG: CPT | Performed by: ANESTHESIOLOGY

## 2024-07-11 PROCEDURE — 1159F MED LIST DOCD IN RCRD: CPT | Performed by: ANESTHESIOLOGY

## 2024-07-11 PROCEDURE — 99214 OFFICE O/P EST MOD 30 MIN: CPT | Performed by: ANESTHESIOLOGY

## 2024-07-11 PROCEDURE — 1036F TOBACCO NON-USER: CPT | Performed by: ANESTHESIOLOGY

## 2024-07-11 PROCEDURE — 1160F RVW MEDS BY RX/DR IN RCRD: CPT | Performed by: ANESTHESIOLOGY

## 2024-07-11 PROCEDURE — 1125F AMNT PAIN NOTED PAIN PRSNT: CPT | Performed by: ANESTHESIOLOGY

## 2024-07-11 PROCEDURE — 3078F DIAST BP <80 MM HG: CPT | Performed by: ANESTHESIOLOGY

## 2024-07-11 PROCEDURE — 1157F ADVNC CARE PLAN IN RCRD: CPT | Performed by: ANESTHESIOLOGY

## 2024-07-11 RX ORDER — SODIUM CHLORIDE, SODIUM LACTATE, POTASSIUM CHLORIDE, CALCIUM CHLORIDE 600; 310; 30; 20 MG/100ML; MG/100ML; MG/100ML; MG/100ML
20 INJECTION, SOLUTION INTRAVENOUS CONTINUOUS
OUTPATIENT
Start: 2024-07-11

## 2024-07-11 RX ORDER — ASPIRIN 81 MG/1
81 TABLET ORAL DAILY
COMMUNITY

## 2024-07-11 ASSESSMENT — ENCOUNTER SYMPTOMS
PSYCHIATRIC NEGATIVE: 1
EYES NEGATIVE: 1
CONSTITUTIONAL NEGATIVE: 1
HEMATOLOGIC/LYMPHATIC NEGATIVE: 1
BACK PAIN: 1
NUMBNESS: 1
ALLERGIC/IMMUNOLOGIC NEGATIVE: 1
ARTHRALGIAS: 1
DIZZINESS: 1
MYALGIAS: 1
CARDIOVASCULAR NEGATIVE: 1
WEAKNESS: 1
ENDOCRINE NEGATIVE: 1
RESPIRATORY NEGATIVE: 1
GASTROINTESTINAL NEGATIVE: 1

## 2024-07-11 ASSESSMENT — PATIENT HEALTH QUESTIONNAIRE - PHQ9
1. LITTLE INTEREST OR PLEASURE IN DOING THINGS: NOT AT ALL
2. FEELING DOWN, DEPRESSED OR HOPELESS: NOT AT ALL
SUM OF ALL RESPONSES TO PHQ9 QUESTIONS 1 AND 2: 0

## 2024-07-11 ASSESSMENT — LIFESTYLE VARIABLES: TOTAL SCORE: 0

## 2024-07-11 ASSESSMENT — PAIN DESCRIPTION - DESCRIPTORS: DESCRIPTORS: ACHING;BURNING;NUMBNESS;TINGLING

## 2024-07-11 ASSESSMENT — PAIN SCALES - GENERAL
PAINLEVEL_OUTOF10: 8
PAINLEVEL: 8

## 2024-07-11 ASSESSMENT — PAIN - FUNCTIONAL ASSESSMENT: PAIN_FUNCTIONAL_ASSESSMENT: 0-10

## 2024-07-11 NOTE — PROGRESS NOTES
Subjective   Patient ID: Jossue Tam is a 79 y.o. male who presents for Back Pain, Hand Pain, and Foot Pain.  Back Pain  Associated symptoms include numbness and weakness.   Hand Pain   Associated symptoms include numbness.     Patient here today for management of his chronic back pain as well as his chemotherapy-induced neuropathy.  Patient states that he does need to get in for intervention.  He would like to get set up for caudal epidural steroid injection as well as a right sacroiliac joint injection.  He has not had any since the fall, October, before he left for [place].  His back pain has been significant it is worse with standing and walking and improves with sitting down.  He also has severe pain in the right sacroiliac joint which she is struggled with for many years.  His last injection did provide 100% pain relief up until about 1 month ago.  He has a hard time transitioning from sitting to standing and gets significant mechanical pain that is point tender over the right buttock.  The worst thing for him now is his neuropathy.  It is in his hands and his feet he has severe burning pain.  He has a hard time holding anything in his hands cannot open up a pop can or bottle of water.  The only option that he has been given is Lyrica which his primary care doctor increased to 300 mg twice a day.  He has had significant drowsiness as well as confusion and lightheadedness recently the doctors cannot figure out.  He would like to know if there are any options for his neuropathy.  Review of Systems   Constitutional: Negative.    HENT: Negative.     Eyes: Negative.    Respiratory: Negative.     Cardiovascular: Negative.    Gastrointestinal: Negative.    Endocrine: Negative.    Genitourinary: Negative.    Musculoskeletal:  Positive for arthralgias, back pain, gait problem and myalgias.   Skin: Negative.    Allergic/Immunologic: Negative.    Neurological:  Positive for dizziness, weakness and numbness.    Hematological: Negative.    Psychiatric/Behavioral: Negative.         Objective   Physical Exam  Constitutional:       Appearance: Normal appearance. He is normal weight.   HENT:      Head: Normocephalic and atraumatic.      Nose: Nose normal.      Mouth/Throat:      Mouth: Mucous membranes are moist.      Pharynx: Oropharynx is clear.   Eyes:      Conjunctiva/sclera: Conjunctivae normal.      Pupils: Pupils are equal, round, and reactive to light.   Cardiovascular:      Rate and Rhythm: Normal rate.      Pulses: Normal pulses.   Pulmonary:      Effort: Pulmonary effort is normal. No respiratory distress.   Skin:     General: Skin is warm and dry.   Neurological:      Mental Status: He is alert and oriented to person, place, and time.      Sensory: Sensory deficit present.      Motor: Weakness present.      Coordination: Coordination abnormal.      Gait: Gait abnormal.      Comments: Stocking-glove loss of sensation wrist 3 fingers bilaterally in the upper extremities and ankles through feet in the lower extremities.    Patient has hypersensitivity to moderate pressure in feet.   Psychiatric:         Mood and Affect: Mood normal.         Assessment/Plan   Problem List Items Addressed This Visit             ICD-10-CM    Spinal stenosis, lumbar region, with neurogenic claudication - Primary M48.062    Relevant Orders    Epidural Steroid Injection    FL pain management     Other Visit Diagnoses         Codes    Lumbar radiculopathy     M54.16    Sacroiliitis (CMS-HCC)     M46.1    Relevant Orders    Sacroiliac Joint Injection    FL pain management    Other chronic pain     G89.29    Chemotherapy-induced neuropathy (Multi)     G62.0, T45.1X5A             I nice discussion with the patient today our plan will be as follows.    Radiology: All available imaging was reviewed today.    Physically: Patient should continue to work on home exercise program to help with deconditioning.  I did tell the patient he needs to start  using his assistive devices for stability so he does not fall.    Psychologically: No issues at this time.    Medication: Patient refilled medications with primary care doctor.  I did warn him that increasing his Lyrica it could add to his confusion and dizziness.  However the patient is good to proceed forward with scrambler therapy and we will have him wean off of his Lyrica 72 hours before he starts treatment.  A PDMP report was checked today, and was consistent with reported prescribing.    Duration: Greater than 1 year.    Intervention: Patient with elevated back pain from his spinal stenosis as well as sacroiliac joint pain.  We will get the patient in for a right sacroiliac joint injection under fluoroscopic guidance.  Last injection was in October 2023 which she had near 100% pain relief up until approximately 2 weeks ago.  Risks, benefit, and alternatives of the procedure were discussed with the patient.  Oswestry score has been compelted and recorded.    Patient suffering with stenotic low back pain last caudal epidural steroid injection was in September 2023.  Sustained over 50% relief for 6 months.  I will get him set up for another caudal epidural steroid injection under fluoroscopic guidance and local anesthetic.  Risks, benefit, and alternatives of the procedure were discussed with the patient.  Oswestry score has been compelted and recorded.    Patient is an excellent candidate for scrambler therapy for the treatment of his chemotherapy-induced neuropathy.  We will target his upper extremities to begin with.  Dermatomal mapping  Left upper extremity: C6 and C8  Right upper extremity: C6 and C8      Jersey Johnson MD 07/11/24 4:40 PM

## 2024-07-16 ENCOUNTER — APPOINTMENT (OUTPATIENT)
Dept: RADIOLOGY | Facility: HOSPITAL | Age: 79
End: 2024-07-16
Payer: MEDICARE

## 2024-07-16 ENCOUNTER — APPOINTMENT (OUTPATIENT)
Dept: CARDIOLOGY | Facility: HOSPITAL | Age: 79
End: 2024-07-16
Payer: MEDICARE

## 2024-07-16 ENCOUNTER — TELEPHONE (OUTPATIENT)
Dept: PAIN MEDICINE | Facility: CLINIC | Age: 79
End: 2024-07-16
Payer: MEDICARE

## 2024-07-16 ENCOUNTER — HOSPITAL ENCOUNTER (OUTPATIENT)
Facility: HOSPITAL | Age: 79
Setting detail: OBSERVATION
Discharge: HOME HEALTH | End: 2024-07-17
Attending: STUDENT IN AN ORGANIZED HEALTH CARE EDUCATION/TRAINING PROGRAM | Admitting: INTERNAL MEDICINE
Payer: MEDICARE

## 2024-07-16 DIAGNOSIS — F33.0 MILD EPISODE OF RECURRENT MAJOR DEPRESSIVE DISORDER (CMS-HCC): ICD-10-CM

## 2024-07-16 DIAGNOSIS — G25.81 RESTLESS LEG: ICD-10-CM

## 2024-07-16 DIAGNOSIS — M47.816 LUMBAR SPONDYLOSIS: ICD-10-CM

## 2024-07-16 DIAGNOSIS — R42 DIZZINESS: Primary | ICD-10-CM

## 2024-07-16 DIAGNOSIS — R42 POSTURAL DIZZINESS WITH NEAR SYNCOPE: ICD-10-CM

## 2024-07-16 DIAGNOSIS — R55 POSTURAL DIZZINESS WITH NEAR SYNCOPE: ICD-10-CM

## 2024-07-16 DIAGNOSIS — R11.0 NAUSEA: ICD-10-CM

## 2024-07-16 LAB
ALBUMIN SERPL BCP-MCNC: 4.2 G/DL (ref 3.4–5)
ALP SERPL-CCNC: 64 U/L (ref 33–136)
ALT SERPL W P-5'-P-CCNC: 41 U/L (ref 10–52)
ANION GAP SERPL CALC-SCNC: 18 MMOL/L (ref 10–20)
APPEARANCE UR: CLEAR
AST SERPL W P-5'-P-CCNC: 43 U/L (ref 9–39)
BASOPHILS # BLD AUTO: 0.04 X10*3/UL (ref 0–0.1)
BASOPHILS NFR BLD AUTO: 0.4 %
BILIRUB SERPL-MCNC: 0.7 MG/DL (ref 0–1.2)
BILIRUB UR STRIP.AUTO-MCNC: NEGATIVE MG/DL
BNP SERPL-MCNC: 13 PG/ML (ref 0–99)
BUN SERPL-MCNC: 17 MG/DL (ref 6–23)
CALCIUM SERPL-MCNC: 9.2 MG/DL (ref 8.6–10.3)
CARDIAC TROPONIN I PNL SERPL HS: 3 NG/L (ref 0–20)
CARDIAC TROPONIN I PNL SERPL HS: 3 NG/L (ref 0–20)
CHLORIDE SERPL-SCNC: 100 MMOL/L (ref 98–107)
CO2 SERPL-SCNC: 23 MMOL/L (ref 21–32)
COLOR UR: ABNORMAL
CREAT SERPL-MCNC: 0.84 MG/DL (ref 0.5–1.3)
EGFRCR SERPLBLD CKD-EPI 2021: 90 ML/MIN/1.73M*2
EOSINOPHIL # BLD AUTO: 0.22 X10*3/UL (ref 0–0.4)
EOSINOPHIL NFR BLD AUTO: 2.4 %
ERYTHROCYTE [DISTWIDTH] IN BLOOD BY AUTOMATED COUNT: 13.6 % (ref 11.5–14.5)
GLUCOSE BLD MANUAL STRIP-MCNC: 118 MG/DL (ref 74–99)
GLUCOSE SERPL-MCNC: 179 MG/DL (ref 74–99)
GLUCOSE UR STRIP.AUTO-MCNC: ABNORMAL MG/DL
HCT VFR BLD AUTO: 46.1 % (ref 41–52)
HGB BLD-MCNC: 15.3 G/DL (ref 13.5–17.5)
IMM GRANULOCYTES # BLD AUTO: 0.05 X10*3/UL (ref 0–0.5)
IMM GRANULOCYTES NFR BLD AUTO: 0.5 % (ref 0–0.9)
KETONES UR STRIP.AUTO-MCNC: ABNORMAL MG/DL
LEUKOCYTE ESTERASE UR QL STRIP.AUTO: NEGATIVE
LYMPHOCYTES # BLD AUTO: 3.42 X10*3/UL (ref 0.8–3)
LYMPHOCYTES NFR BLD AUTO: 37 %
MAGNESIUM SERPL-MCNC: 2.06 MG/DL (ref 1.6–2.4)
MCH RBC QN AUTO: 30.1 PG (ref 26–34)
MCHC RBC AUTO-ENTMCNC: 33.2 G/DL (ref 32–36)
MCV RBC AUTO: 91 FL (ref 80–100)
MONOCYTES # BLD AUTO: 0.63 X10*3/UL (ref 0.05–0.8)
MONOCYTES NFR BLD AUTO: 6.8 %
NEUTROPHILS # BLD AUTO: 4.89 X10*3/UL (ref 1.6–5.5)
NEUTROPHILS NFR BLD AUTO: 52.9 %
NITRITE UR QL STRIP.AUTO: NEGATIVE
NRBC BLD-RTO: 0 /100 WBCS (ref 0–0)
PH UR STRIP.AUTO: 5.5 [PH]
PLATELET # BLD AUTO: 171 X10*3/UL (ref 150–450)
POTASSIUM SERPL-SCNC: 4.3 MMOL/L (ref 3.5–5.3)
PROT SERPL-MCNC: 7 G/DL (ref 6.4–8.2)
PROT UR STRIP.AUTO-MCNC: NEGATIVE MG/DL
RBC # BLD AUTO: 5.09 X10*6/UL (ref 4.5–5.9)
RBC # UR STRIP.AUTO: NEGATIVE /UL
SODIUM SERPL-SCNC: 137 MMOL/L (ref 136–145)
SP GR UR STRIP.AUTO: 1.03
UROBILINOGEN UR STRIP.AUTO-MCNC: NORMAL MG/DL
WBC # BLD AUTO: 9.3 X10*3/UL (ref 4.4–11.3)

## 2024-07-16 PROCEDURE — 90715 TDAP VACCINE 7 YRS/> IM: CPT

## 2024-07-16 PROCEDURE — 81003 URINALYSIS AUTO W/O SCOPE: CPT

## 2024-07-16 PROCEDURE — 73564 X-RAY EXAM KNEE 4 OR MORE: CPT | Mod: RIGHT SIDE | Performed by: RADIOLOGY

## 2024-07-16 PROCEDURE — 2500000001 HC RX 250 WO HCPCS SELF ADMINISTERED DRUGS (ALT 637 FOR MEDICARE OP)

## 2024-07-16 PROCEDURE — 96372 THER/PROPH/DIAG INJ SC/IM: CPT | Performed by: NURSE PRACTITIONER

## 2024-07-16 PROCEDURE — 72125 CT NECK SPINE W/O DYE: CPT

## 2024-07-16 PROCEDURE — 36415 COLL VENOUS BLD VENIPUNCTURE: CPT

## 2024-07-16 PROCEDURE — 2500000004 HC RX 250 GENERAL PHARMACY W/ HCPCS (ALT 636 FOR OP/ED)

## 2024-07-16 PROCEDURE — 84484 ASSAY OF TROPONIN QUANT: CPT

## 2024-07-16 PROCEDURE — 70450 CT HEAD/BRAIN W/O DYE: CPT

## 2024-07-16 PROCEDURE — 99285 EMERGENCY DEPT VISIT HI MDM: CPT | Mod: 25

## 2024-07-16 PROCEDURE — 90471 IMMUNIZATION ADMIN: CPT

## 2024-07-16 PROCEDURE — 73110 X-RAY EXAM OF WRIST: CPT | Mod: RT

## 2024-07-16 PROCEDURE — 73564 X-RAY EXAM KNEE 4 OR MORE: CPT | Mod: RT

## 2024-07-16 PROCEDURE — 80053 COMPREHEN METABOLIC PANEL: CPT

## 2024-07-16 PROCEDURE — 93005 ELECTROCARDIOGRAM TRACING: CPT

## 2024-07-16 PROCEDURE — G0378 HOSPITAL OBSERVATION PER HR: HCPCS

## 2024-07-16 PROCEDURE — 83735 ASSAY OF MAGNESIUM: CPT

## 2024-07-16 PROCEDURE — 70450 CT HEAD/BRAIN W/O DYE: CPT | Performed by: RADIOLOGY

## 2024-07-16 PROCEDURE — 71045 X-RAY EXAM CHEST 1 VIEW: CPT | Performed by: RADIOLOGY

## 2024-07-16 PROCEDURE — 83880 ASSAY OF NATRIURETIC PEPTIDE: CPT

## 2024-07-16 PROCEDURE — 96374 THER/PROPH/DIAG INJ IV PUSH: CPT | Mod: 59

## 2024-07-16 PROCEDURE — 94760 N-INVAS EAR/PLS OXIMETRY 1: CPT

## 2024-07-16 PROCEDURE — 96361 HYDRATE IV INFUSION ADD-ON: CPT

## 2024-07-16 PROCEDURE — 2500000004 HC RX 250 GENERAL PHARMACY W/ HCPCS (ALT 636 FOR OP/ED): Performed by: NURSE PRACTITIONER

## 2024-07-16 PROCEDURE — 82947 ASSAY GLUCOSE BLOOD QUANT: CPT | Mod: 59

## 2024-07-16 PROCEDURE — 99223 1ST HOSP IP/OBS HIGH 75: CPT | Performed by: NURSE PRACTITIONER

## 2024-07-16 PROCEDURE — 85025 COMPLETE CBC W/AUTO DIFF WBC: CPT

## 2024-07-16 PROCEDURE — 72125 CT NECK SPINE W/O DYE: CPT | Performed by: RADIOLOGY

## 2024-07-16 PROCEDURE — 71045 X-RAY EXAM CHEST 1 VIEW: CPT

## 2024-07-16 PROCEDURE — 2500000001 HC RX 250 WO HCPCS SELF ADMINISTERED DRUGS (ALT 637 FOR MEDICARE OP): Performed by: NURSE PRACTITIONER

## 2024-07-16 RX ORDER — ROPINIROLE 0.25 MG/1
0.25 TABLET, FILM COATED ORAL NIGHTLY
Status: DISCONTINUED | OUTPATIENT
Start: 2024-07-17 | End: 2024-07-17 | Stop reason: HOSPADM

## 2024-07-16 RX ORDER — ALLOPURINOL 100 MG/1
100 TABLET ORAL DAILY
Status: DISCONTINUED | OUTPATIENT
Start: 2024-07-17 | End: 2024-07-17 | Stop reason: HOSPADM

## 2024-07-16 RX ORDER — ACYCLOVIR 200 MG/1
400 CAPSULE ORAL 2 TIMES DAILY
Status: DISCONTINUED | OUTPATIENT
Start: 2024-07-16 | End: 2024-07-17 | Stop reason: HOSPADM

## 2024-07-16 RX ORDER — INSULIN LISPRO 100 [IU]/ML
0-5 INJECTION, SOLUTION INTRAVENOUS; SUBCUTANEOUS
Status: DISCONTINUED | OUTPATIENT
Start: 2024-07-17 | End: 2024-07-17 | Stop reason: HOSPADM

## 2024-07-16 RX ORDER — ROPINIROLE 0.25 MG/1
0.25 TABLET, FILM COATED ORAL 3 TIMES DAILY
Status: DISCONTINUED | OUTPATIENT
Start: 2024-07-16 | End: 2024-07-16

## 2024-07-16 RX ORDER — ASPIRIN 325 MG
325 TABLET ORAL ONCE
Status: COMPLETED | OUTPATIENT
Start: 2024-07-16 | End: 2024-07-16

## 2024-07-16 RX ORDER — INSULIN GLARGINE 100 [IU]/ML
25 INJECTION, SOLUTION SUBCUTANEOUS EVERY 24 HOURS
Status: DISCONTINUED | OUTPATIENT
Start: 2024-07-16 | End: 2024-07-17 | Stop reason: HOSPADM

## 2024-07-16 RX ORDER — MECLIZINE HYDROCHLORIDE 25 MG/1
25 TABLET ORAL ONCE
Status: COMPLETED | OUTPATIENT
Start: 2024-07-16 | End: 2024-07-16

## 2024-07-16 RX ORDER — HYDRALAZINE HYDROCHLORIDE 25 MG/1
25 TABLET, FILM COATED ORAL EVERY 6 HOURS PRN
Status: DISCONTINUED | OUTPATIENT
Start: 2024-07-18 | End: 2024-07-17 | Stop reason: HOSPADM

## 2024-07-16 RX ORDER — PREGABALIN 75 MG/1
300 CAPSULE ORAL 2 TIMES DAILY
Status: DISCONTINUED | OUTPATIENT
Start: 2024-07-17 | End: 2024-07-17

## 2024-07-16 RX ORDER — ENOXAPARIN SODIUM 100 MG/ML
40 INJECTION SUBCUTANEOUS EVERY 24 HOURS
Status: DISCONTINUED | OUTPATIENT
Start: 2024-07-16 | End: 2024-07-17 | Stop reason: HOSPADM

## 2024-07-16 RX ORDER — SODIUM CHLORIDE, SODIUM LACTATE, POTASSIUM CHLORIDE, CALCIUM CHLORIDE 600; 310; 30; 20 MG/100ML; MG/100ML; MG/100ML; MG/100ML
20 INJECTION, SOLUTION INTRAVENOUS CONTINUOUS
Status: DISCONTINUED | OUTPATIENT
Start: 2024-07-16 | End: 2024-07-16

## 2024-07-16 RX ORDER — DEXTROSE 50 % IN WATER (D50W) INTRAVENOUS SYRINGE
12.5
Status: DISCONTINUED | OUTPATIENT
Start: 2024-07-16 | End: 2024-07-17 | Stop reason: HOSPADM

## 2024-07-16 RX ORDER — DONEPEZIL HYDROCHLORIDE 5 MG/1
10 TABLET, FILM COATED ORAL NIGHTLY
Status: DISCONTINUED | OUTPATIENT
Start: 2024-07-16 | End: 2024-07-17 | Stop reason: HOSPADM

## 2024-07-16 RX ORDER — DULOXETIN HYDROCHLORIDE 60 MG/1
60 CAPSULE, DELAYED RELEASE ORAL DAILY
Status: DISCONTINUED | OUTPATIENT
Start: 2024-07-17 | End: 2024-07-17 | Stop reason: HOSPADM

## 2024-07-16 RX ORDER — ONDANSETRON HYDROCHLORIDE 2 MG/ML
4 INJECTION, SOLUTION INTRAVENOUS ONCE
Status: COMPLETED | OUTPATIENT
Start: 2024-07-16 | End: 2024-07-16

## 2024-07-16 RX ORDER — ASPIRIN 81 MG/1
81 TABLET ORAL DAILY
Status: DISCONTINUED | OUTPATIENT
Start: 2024-07-17 | End: 2024-07-17 | Stop reason: HOSPADM

## 2024-07-16 RX ORDER — METFORMIN HYDROCHLORIDE 500 MG/1
1000 TABLET ORAL
Status: DISCONTINUED | OUTPATIENT
Start: 2024-07-17 | End: 2024-07-17 | Stop reason: HOSPADM

## 2024-07-16 RX ORDER — PANTOPRAZOLE SODIUM 40 MG/1
40 TABLET, DELAYED RELEASE ORAL
Status: DISCONTINUED | OUTPATIENT
Start: 2024-07-17 | End: 2024-07-17 | Stop reason: HOSPADM

## 2024-07-16 RX ORDER — LABETALOL HYDROCHLORIDE 5 MG/ML
10 INJECTION, SOLUTION INTRAVENOUS EVERY 10 MIN PRN
Status: DISCONTINUED | OUTPATIENT
Start: 2024-07-16 | End: 2024-07-17 | Stop reason: HOSPADM

## 2024-07-16 RX ORDER — METOCLOPRAMIDE 10 MG/1
5 TABLET ORAL 3 TIMES DAILY
Status: DISCONTINUED | OUTPATIENT
Start: 2024-07-16 | End: 2024-07-17 | Stop reason: HOSPADM

## 2024-07-16 RX ORDER — SODIUM CHLORIDE 9 MG/ML
75 INJECTION, SOLUTION INTRAVENOUS CONTINUOUS
Status: ACTIVE | OUTPATIENT
Start: 2024-07-16 | End: 2024-07-17

## 2024-07-16 RX ORDER — ATORVASTATIN CALCIUM 80 MG/1
80 TABLET, FILM COATED ORAL NIGHTLY
Status: DISCONTINUED | OUTPATIENT
Start: 2024-07-16 | End: 2024-07-17 | Stop reason: HOSPADM

## 2024-07-16 RX ORDER — CHOLECALCIFEROL (VITAMIN D3) 25 MCG
2000 TABLET ORAL DAILY
Status: DISCONTINUED | OUTPATIENT
Start: 2024-07-17 | End: 2024-07-17 | Stop reason: HOSPADM

## 2024-07-16 RX ORDER — DEXTROSE 50 % IN WATER (D50W) INTRAVENOUS SYRINGE
25
Status: DISCONTINUED | OUTPATIENT
Start: 2024-07-16 | End: 2024-07-17 | Stop reason: HOSPADM

## 2024-07-16 RX ORDER — DOCUSATE SODIUM 100 MG/1
100 CAPSULE, LIQUID FILLED ORAL 2 TIMES DAILY
Status: DISCONTINUED | OUTPATIENT
Start: 2024-07-16 | End: 2024-07-17 | Stop reason: HOSPADM

## 2024-07-16 RX ORDER — TAMSULOSIN HYDROCHLORIDE 0.4 MG/1
0.8 CAPSULE ORAL DAILY
Status: DISCONTINUED | OUTPATIENT
Start: 2024-07-17 | End: 2024-07-17 | Stop reason: HOSPADM

## 2024-07-16 RX ORDER — HYDRALAZINE HYDROCHLORIDE 20 MG/ML
10 INJECTION INTRAMUSCULAR; INTRAVENOUS
Status: DISCONTINUED | OUTPATIENT
Start: 2024-07-16 | End: 2024-07-17 | Stop reason: HOSPADM

## 2024-07-16 RX ORDER — ICOSAPENT ETHYL 1 G/1
2 CAPSULE ORAL
Status: DISCONTINUED | OUTPATIENT
Start: 2024-07-17 | End: 2024-07-17 | Stop reason: HOSPADM

## 2024-07-16 RX ORDER — AMITRIPTYLINE HYDROCHLORIDE 25 MG/1
50 TABLET, FILM COATED ORAL NIGHTLY
Status: DISCONTINUED | OUTPATIENT
Start: 2024-07-16 | End: 2024-07-17 | Stop reason: HOSPADM

## 2024-07-16 SDOH — SOCIAL STABILITY: SOCIAL INSECURITY: ABUSE: ADULT

## 2024-07-16 SDOH — SOCIAL STABILITY: SOCIAL INSECURITY: DOES ANYONE TRY TO KEEP YOU FROM HAVING/CONTACTING OTHER FRIENDS OR DOING THINGS OUTSIDE YOUR HOME?: NO

## 2024-07-16 SDOH — SOCIAL STABILITY: SOCIAL INSECURITY: DO YOU FEEL ANYONE HAS EXPLOITED OR TAKEN ADVANTAGE OF YOU FINANCIALLY OR OF YOUR PERSONAL PROPERTY?: NO

## 2024-07-16 SDOH — SOCIAL STABILITY: SOCIAL INSECURITY: ARE YOU OR HAVE YOU BEEN THREATENED OR ABUSED PHYSICALLY, EMOTIONALLY, OR SEXUALLY BY ANYONE?: NO

## 2024-07-16 SDOH — ECONOMIC STABILITY: INCOME INSECURITY: HOW HARD IS IT FOR YOU TO PAY FOR THE VERY BASICS LIKE FOOD, HOUSING, MEDICAL CARE, AND HEATING?: NOT VERY HARD

## 2024-07-16 SDOH — SOCIAL STABILITY: SOCIAL INSECURITY: HAVE YOU HAD THOUGHTS OF HARMING ANYONE ELSE?: NO

## 2024-07-16 SDOH — SOCIAL STABILITY: SOCIAL INSECURITY: WERE YOU ABLE TO COMPLETE ALL THE BEHAVIORAL HEALTH SCREENINGS?: YES

## 2024-07-16 SDOH — SOCIAL STABILITY: SOCIAL INSECURITY: HAVE YOU HAD ANY THOUGHTS OF HARMING ANYONE ELSE?: NO

## 2024-07-16 SDOH — SOCIAL STABILITY: SOCIAL INSECURITY: DO YOU FEEL UNSAFE GOING BACK TO THE PLACE WHERE YOU ARE LIVING?: NO

## 2024-07-16 SDOH — SOCIAL STABILITY: SOCIAL INSECURITY: ARE THERE ANY APPARENT SIGNS OF INJURIES/BEHAVIORS THAT COULD BE RELATED TO ABUSE/NEGLECT?: NO

## 2024-07-16 SDOH — SOCIAL STABILITY: SOCIAL INSECURITY: HAS ANYONE EVER THREATENED TO HURT YOUR FAMILY OR YOUR PETS?: NO

## 2024-07-16 ASSESSMENT — LIFESTYLE VARIABLES
HAVE PEOPLE ANNOYED YOU BY CRITICIZING YOUR DRINKING: NO
EVER HAD A DRINK FIRST THING IN THE MORNING TO STEADY YOUR NERVES TO GET RID OF A HANGOVER: NO
SKIP TO QUESTIONS 9-10: 1
AUDIT-C TOTAL SCORE: 0
HOW OFTEN DO YOU HAVE 6 OR MORE DRINKS ON ONE OCCASION: NEVER
TOTAL SCORE: 0
HOW MANY STANDARD DRINKS CONTAINING ALCOHOL DO YOU HAVE ON A TYPICAL DAY: PATIENT DOES NOT DRINK
AUDIT-C TOTAL SCORE: 0
HOW OFTEN DO YOU HAVE A DRINK CONTAINING ALCOHOL: NEVER
EVER FELT BAD OR GUILTY ABOUT YOUR DRINKING: NO
HAVE YOU EVER FELT YOU SHOULD CUT DOWN ON YOUR DRINKING: NO

## 2024-07-16 ASSESSMENT — COGNITIVE AND FUNCTIONAL STATUS - GENERAL
PATIENT BASELINE BEDBOUND: NO
DAILY ACTIVITIY SCORE: 24
MOBILITY SCORE: 24

## 2024-07-16 ASSESSMENT — ACTIVITIES OF DAILY LIVING (ADL)
HEARING - LEFT EAR: HEARING AID
HEARING - RIGHT EAR: HEARING AID
WALKS IN HOME: INDEPENDENT
TOILETING: INDEPENDENT
ADEQUATE_TO_COMPLETE_ADL: YES
DRESSING YOURSELF: INDEPENDENT
GROOMING: INDEPENDENT
FEEDING YOURSELF: INDEPENDENT
BATHING: INDEPENDENT
JUDGMENT_ADEQUATE_SAFELY_COMPLETE_DAILY_ACTIVITIES: YES
LACK_OF_TRANSPORTATION: NO
PATIENT'S MEMORY ADEQUATE TO SAFELY COMPLETE DAILY ACTIVITIES?: YES

## 2024-07-16 ASSESSMENT — PAIN DESCRIPTION - ORIENTATION: ORIENTATION: RIGHT

## 2024-07-16 ASSESSMENT — PAIN DESCRIPTION - PAIN TYPE: TYPE: ACUTE PAIN

## 2024-07-16 ASSESSMENT — PAIN DESCRIPTION - LOCATION: LOCATION: WRIST

## 2024-07-16 ASSESSMENT — COLUMBIA-SUICIDE SEVERITY RATING SCALE - C-SSRS
2. HAVE YOU ACTUALLY HAD ANY THOUGHTS OF KILLING YOURSELF?: NO
6. HAVE YOU EVER DONE ANYTHING, STARTED TO DO ANYTHING, OR PREPARED TO DO ANYTHING TO END YOUR LIFE?: NO
1. IN THE PAST MONTH, HAVE YOU WISHED YOU WERE DEAD OR WISHED YOU COULD GO TO SLEEP AND NOT WAKE UP?: NO

## 2024-07-16 ASSESSMENT — PAIN - FUNCTIONAL ASSESSMENT
PAIN_FUNCTIONAL_ASSESSMENT: 0-10
PAIN_FUNCTIONAL_ASSESSMENT: 0-10

## 2024-07-16 ASSESSMENT — PAIN SCALES - GENERAL
PAINLEVEL_OUTOF10: 0 - NO PAIN
PAINLEVEL_OUTOF10: 4
PAINLEVEL_OUTOF10: 0 - NO PAIN

## 2024-07-16 ASSESSMENT — PATIENT HEALTH QUESTIONNAIRE - PHQ9
2. FEELING DOWN, DEPRESSED OR HOPELESS: NOT AT ALL
SUM OF ALL RESPONSES TO PHQ9 QUESTIONS 1 & 2: 0
1. LITTLE INTEREST OR PLEASURE IN DOING THINGS: NOT AT ALL

## 2024-07-16 NOTE — TELEPHONE ENCOUNTER
PATIENT WILL CALL BACK AT A LATER DATE TO SCHEDULE SCRAMBLER THERAPY. DATES AVAILABLE DID NOT WORK FOR HIM

## 2024-07-16 NOTE — H&P
History Of Present Illness  Jossue Tam is a 79 y.o. male with a pertinent medical history of insulin-dependent diabetes, hypertension, dementia, chemo induced neuropathy, anxiety/depression & B cell Lymphoma in remission who presented by squad to [facility] ER due to dizziness that led to ?syncopal and collapse.  Patient says he was coming out of the dentist office when he felt dizzy and passed out and hit the concrete.  He has fallen previously, but denied previous syncope. He said he has had worsening dizziness over the past few weeks, but has had it for some time,  but was unable to quantify the time. HE says he notices it with positional changes. He mentioned he recently had his Lyrica dose increased which she takes for chemo induced neuropathy. He is also on Cymbalta and Requip. He complains of some mild wrist and knee pain where he has abrasions.  He also mentioned being at Dr. Pleitez's office yesterday and had an echocardiogram and also had a recent carotid ultrasound.  He denied headache, chest pain, dyspnea.  Pertinent workup in ER showed: trop x's 2 neg, UA showed glucosuria and trace ketonuria. Otherwise unremakable.  CT of the C-spine and head are negative for acute findings.  Chest x-ray negative for acute findings.  No acute abnormality noted on right knee x-ray.  Severe osteoarthritis noted in the right wrist x-ray.    Past Medical History  He has a past medical history of Back abscess (05/30/2023) and Hallucinations, visual (05/30/2023), insulin-dependent diabetes with gastroparesis, diffuse large B-cell lymphoma with chemo, hypertension, dementia, BPH, depression/anxiety, gout, HL, GERD, ZACK, falls hx, hx of steroids induced hallucinations, chemo induced neuropathy.    Surgical History  Ankle repair, hernia repair.     Social History  He reports that he quit smoking about 41 years ago. His smoking use included cigarettes. He has never used smokeless tobacco. He reports that he does not use drugs. No  history on file for alcohol use.    Family History  Family history of lung cancer in father and paternal grandmother with leukemia.     Allergies  Naproxen    Review of Systems   Constitutional:  Negative for chills and fever.   HENT:  Negative for sneezing and sore throat.    Eyes:  Negative for redness, itching and visual disturbance.        Denies vision changes   Respiratory:  Negative for cough and shortness of breath.    Cardiovascular:  Negative for chest pain and palpitations.   Gastrointestinal:  Negative for abdominal pain, constipation, diarrhea, nausea and vomiting.   Endocrine: Negative for polydipsia and polyuria.   Genitourinary:  Negative for dysuria and urgency.   Musculoskeletal:  Positive for arthralgias and myalgias.   Skin:  Negative for color change and rash.   Neurological:  Positive for dizziness, syncope, weakness and numbness. Negative for tremors, facial asymmetry, speech difficulty, light-headedness and headaches.        +baseline paraesthesias, +generalized weakness   Psychiatric/Behavioral:  Positive for confusion. Negative for behavioral problems.      Physical Exam  Vitals reviewed.   Constitutional:       General: He is not in acute distress.     Appearance: He is obese. He is not ill-appearing, toxic-appearing or diaphoretic.   HENT:      Head: Normocephalic and atraumatic.      Mouth/Throat:      Mouth: Mucous membranes are dry.      Pharynx: Oropharynx is clear.   Eyes:      Extraocular Movements: Extraocular movements intact.      Conjunctiva/sclera: Conjunctivae normal.      Pupils: Pupils are equal, round, and reactive to light.      Comments: Pupils pinpoint   Neck:      Vascular: No carotid bruit.   Cardiovascular:      Rate and Rhythm: Normal rate and regular rhythm.      Pulses: Normal pulses.      Heart sounds: Murmur heard.   Pulmonary:      Effort: Pulmonary effort is normal.      Breath sounds: Normal breath sounds.   Abdominal:      General: Bowel sounds are normal.  There is distension.      Palpations: Abdomen is soft.      Tenderness: There is no abdominal tenderness. There is no right CVA tenderness, left CVA tenderness or guarding.   Musculoskeletal:         General: No swelling.      Right lower leg: No edema.      Left lower leg: No edema.   Skin:     General: Skin is warm and dry.      Coloration: Skin is pale.   Neurological:      Mental Status: He is alert. He is disoriented.      Motor: No weakness.      Coordination: Coordination abnormal.      Comments: Right arm ataxia and slight confusion on exam, NIH 2, Attempted jhon hallpike and not able to follow 2 commands at the same time.    Psychiatric:         Mood and Affect: Mood normal.         Behavior: Behavior normal.       Last Recorded Vitals  /77 (Patient Position: Standing)   Pulse 92   Temp 36 °C (96.8 °F)   Resp 18   Wt 97.5 kg (215 lb)   SpO2 95%     Relevant Results  Scheduled medications  acyclovir, 400 mg, oral, BID  allopurinol, 100 mg, oral, Daily  amitriptyline, 50 mg, oral, Nightly  aspirin, 81 mg, oral, Daily  atorvastatin, 80 mg, oral, Nightly  cholecalciferol, 2,000 Units, oral, Daily  docusate sodium, 100 mg, oral, BID  donepezil, 10 mg, oral, Nightly  DULoxetine, 60 mg, oral, Daily  empagliflozin, 25 mg, oral, Daily  enoxaparin, 40 mg, subcutaneous, q24h  icosapent ethyL, 2 g, oral, BID  insulin glargine, 25 Units, subcutaneous, q24h  insulin lispro, 0-5 Units, subcutaneous, TID  [Held by provider] metFORMIN, 1,000 mg, oral, BID  metoclopramide, 5 mg, oral, TID  pantoprazole, 40 mg, oral, Daily before breakfast  pregabalin, 300 mg, oral, BID  [Held by provider] rOPINIRole, 0.25 mg, oral, Nightly  tamsulosin, 0.8 mg, oral, Daily      Continuous medications  sodium chloride 0.9%, 75 mL/hr, Last Rate: 75 mL/hr (07/16/24 2227)      PRN medications  PRN medications: acetaminophen **OR** acetaminophen **OR** acetaminophen, dextrose, dextrose, glucagon, glucagon, hydrALAZINE **FOLLOWED BY**  [START ON 7/18/2024] hydrALAZINE, labetaloL, oxygen    Results for orders placed or performed during the hospital encounter of 07/16/24 (from the past 24 hour(s))   CBC and Auto Differential   Result Value Ref Range    WBC 9.3 4.4 - 11.3 x10*3/uL    nRBC 0.0 0.0 - 0.0 /100 WBCs    RBC 5.09 4.50 - 5.90 x10*6/uL    Hemoglobin 15.3 13.5 - 17.5 g/dL    Hematocrit 46.1 41.0 - 52.0 %    MCV 91 80 - 100 fL    MCH 30.1 26.0 - 34.0 pg    MCHC 33.2 32.0 - 36.0 g/dL    RDW 13.6 11.5 - 14.5 %    Platelets 171 150 - 450 x10*3/uL    Neutrophils % 52.9 40.0 - 80.0 %    Immature Granulocytes %, Automated 0.5 0.0 - 0.9 %    Lymphocytes % 37.0 13.0 - 44.0 %    Monocytes % 6.8 2.0 - 10.0 %    Eosinophils % 2.4 0.0 - 6.0 %    Basophils % 0.4 0.0 - 2.0 %    Neutrophils Absolute 4.89 1.60 - 5.50 x10*3/uL    Immature Granulocytes Absolute, Automated 0.05 0.00 - 0.50 x10*3/uL    Lymphocytes Absolute 3.42 (H) 0.80 - 3.00 x10*3/uL    Monocytes Absolute 0.63 0.05 - 0.80 x10*3/uL    Eosinophils Absolute 0.22 0.00 - 0.40 x10*3/uL    Basophils Absolute 0.04 0.00 - 0.10 x10*3/uL   Comprehensive metabolic panel   Result Value Ref Range    Glucose 179 (H) 74 - 99 mg/dL    Sodium 137 136 - 145 mmol/L    Potassium 4.3 3.5 - 5.3 mmol/L    Chloride 100 98 - 107 mmol/L    Bicarbonate 23 21 - 32 mmol/L    Anion Gap 18 10 - 20 mmol/L    Urea Nitrogen 17 6 - 23 mg/dL    Creatinine 0.84 0.50 - 1.30 mg/dL    eGFR 90 >60 mL/min/1.73m*2    Calcium 9.2 8.6 - 10.3 mg/dL    Albumin 4.2 3.4 - 5.0 g/dL    Alkaline Phosphatase 64 33 - 136 U/L    Total Protein 7.0 6.4 - 8.2 g/dL    AST 43 (H) 9 - 39 U/L    Bilirubin, Total 0.7 0.0 - 1.2 mg/dL    ALT 41 10 - 52 U/L   Magnesium   Result Value Ref Range    Magnesium 2.06 1.60 - 2.40 mg/dL   B-Type Natriuretic Peptide   Result Value Ref Range    BNP 13 0 - 99 pg/mL   Troponin I, High Sensitivity, Initial   Result Value Ref Range    Troponin I, High Sensitivity 3 0 - 20 ng/L   Troponin, High Sensitivity, 1 Hour   Result  Value Ref Range    Troponin I, High Sensitivity 3 0 - 20 ng/L   Urinalysis with Reflex Culture and Microscopic   Result Value Ref Range    Color, Urine Light-Yellow Light-Yellow, Yellow, Dark-Yellow    Appearance, Urine Clear Clear    Specific Gravity, Urine 1.032 1.005 - 1.035    pH, Urine 5.5 5.0, 5.5, 6.0, 6.5, 7.0, 7.5, 8.0    Protein, Urine NEGATIVE NEGATIVE, 10 (TRACE), 20 (TRACE) mg/dL    Glucose, Urine OVER (4+) (A) Normal mg/dL    Blood, Urine NEGATIVE NEGATIVE    Ketones, Urine TRACE (A) NEGATIVE mg/dL    Bilirubin, Urine NEGATIVE NEGATIVE    Urobilinogen, Urine Normal Normal mg/dL    Nitrite, Urine NEGATIVE NEGATIVE    Leukocyte Esterase, Urine NEGATIVE NEGATIVE   Extra Urine Gray Tube   Result Value Ref Range    Extra Tube Hold for add-ons.    POCT GLUCOSE   Result Value Ref Range    POCT Glucose 118 (H) 74 - 99 mg/dL     CT head wo IV contrast    Result Date: 7/16/2024  Interpreted By:  Walter Collier, STUDY: CT HEAD WO IV CONTRAST; CT CERVICAL SPINE WO IV CONTRAST;  7/16/2024 5:18 pm   INDICATION: Signs/Symptoms:head injury dizzy; Signs/Symptoms:fall.   COMPARISON: None.   ACCESSION NUMBER(S): [id number]; [id number]   ORDERING CLINICIAN: GEORGETTE GONZALEZ   TECHNIQUE: Noncontrast CT images of head. Axial noncontrast CT images of the cervical spine with coronal and sagittal reconstructed images.   FINDINGS: BRAIN PARENCHYMA:  Mild diffuse cortical atrophy is seen. No mass effect or midline shift.   HEMORRHAGE: No acute intracranial hemorrhage. VENTRICLES and EXTRA-AXIAL SPACES: Mild ex vacuo dilatation of the ventricles. No abnormal extraaxial fluid collection. EXTRACRANIAL SOFT TISSUES: Within normal limits. PARANASAL SINUSES/MASTOIDS:  The visualized paranasal sinuses and mastoid air cells are aerated. CALVARIUM: No depressed skull fracture. No destructive osseous lesion.   OTHER FINDINGS: None.   CERVICAL SPINE:   ALIGNMENT: Mild reversal of cervical lordosis. VERTEBRAE: No acute fracture.  SPINAL CANAL: Multilevel moderate degenerative disc disease seen. No critical spinal canal stenosis. PREVERTEBRAL SOFT TISSUES: No prevertebral soft tissue swelling. LUNG APICES: Imaged portion of the lung apices are within normal limits.   OTHER FINDINGS: None.       No acute intracranial abnormality.   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       MACRO: None   Signed by: Walter Collier 7/16/2024 6:49 PM Dictation workstation:   [id number]    CT cervical spine wo IV contrast    Result Date: 7/16/2024  Interpreted By:  Walter Collier, STUDY: CT HEAD WO IV CONTRAST; CT CERVICAL SPINE WO IV CONTRAST;  7/16/2024 5:18 pm   INDICATION: Signs/Symptoms:head injury dizzy; Signs/Symptoms:fall.   COMPARISON: None.   ACCESSION NUMBER(S): [id number]; [id number]   ORDERING CLINICIAN: GEORGETTE GONZALEZ   TECHNIQUE: Noncontrast CT images of head. Axial noncontrast CT images of the cervical spine with coronal and sagittal reconstructed images.   FINDINGS: BRAIN PARENCHYMA:  Mild diffuse cortical atrophy is seen. No mass effect or midline shift.   HEMORRHAGE: No acute intracranial hemorrhage. VENTRICLES and EXTRA-AXIAL SPACES: Mild ex vacuo dilatation of the ventricles. No abnormal extraaxial fluid collection. EXTRACRANIAL SOFT TISSUES: Within normal limits. PARANASAL SINUSES/MASTOIDS:  The visualized paranasal sinuses and mastoid air cells are aerated. CALVARIUM: No depressed skull fracture. No destructive osseous lesion.   OTHER FINDINGS: None.   CERVICAL SPINE:   ALIGNMENT: Mild reversal of cervical lordosis. VERTEBRAE: No acute fracture. SPINAL CANAL: Multilevel moderate degenerative disc disease seen. No critical spinal canal stenosis. PREVERTEBRAL SOFT TISSUES: No prevertebral soft tissue swelling. LUNG APICES: Imaged portion of the lung apices are within normal limits.   OTHER FINDINGS: None.       No acute intracranial abnormality.   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        MACRO: None   Signed by: Walter Collier 7/16/2024 6:49 PM Dictation workstation:   [id number]    XR knee right 4+ views    Result Date: 7/16/2024  Interpreted By:  Bryant Sanchez, STUDY: XR KNEE RIGHT 4+ VIEWS; ;  7/16/2024 4:56 pm   INDICATION: Signs/Symptoms:r knee injury.   COMPARISON: None.   ACCESSION NUMBER(S): [id number]   ORDERING CLINICIAN: GEORGETTE GONZALEZ   FINDINGS: Right knee, four views   There is no fracture. There is no dislocation. There are minimal degenerative changes with osteophytosis in all 3 compartments.. There is no effusion. There is no soft tissue abnormality seen. There is a metallic foreign body in the anterior soft tissues of the knee       Early degenerative change without acute abnormality Metallic foreign body in the anterior soft tissues of the knee   MACRO: None   Signed by: Bryant Sanchez 7/16/2024 5:03 PM Dictation workstation:   [id number]    XR chest 1 view    Result Date: 7/16/2024  Interpreted By:  Bryant Sanchez, STUDY: XR CHEST 1 VIEW;  7/16/2024 4:56 pm   INDICATION: Signs/Symptoms:weak.   COMPARISON: 05/06/2020   ACCESSION NUMBER(S): [id number]   ORDERING CLINICIAN: GEORGETTE GONZALEZ   FINDINGS:         CARDIOMEDIASTINAL SILHOUETTE: Cardiomediastinal silhouette is normal in size and configuration.   LUNGS: Lungs are clear.   ABDOMEN: No remarkable upper abdominal findings.   BONES: No acute osseous changes.       1.  No evidence of acute cardiopulmonary process.       MACRO: None   Signed by: Bryant Sanchez 7/16/2024 5:02 PM Dictation workstation:   [id number]    XR wrist right 3+ views    Result Date: 7/16/2024  Interpreted By:  Bryant Sanchez, STUDY: XR WRIST RIGHT 3+ VIEWS; ;  7/16/2024 4:56 pm   INDICATION: Signs/Symptoms:r wrist injury.   COMPARISON: None.   ACCESSION NUMBER(S): [id number]   ORDERING CLINICIAN: GEORGETTE GONZALEZ   FINDINGS: Right wrist, four views   There is severe joint space narrowing with osteophytosis across the 1st CMC  joint with moderate changes of 1st MCP and triscaphe joint. Degenerative changes in the distal radioulnar joint. There is no fracture or dislocation. Soft tissue edema about the wrist. No erosions or chondrocalcinosis       Multifocal osteoarthritis, severe at the 1st CMC joint     MACRO: None   Signed by: Bryant Sanchez 7/16/2024 5:01 PM Dictation workstation:   [id number]       Assessment/Plan   ?Syncope & Collapse/Ongoing and Worsening Dizziness/HX Falls  -Suspect Polypharmacy, Lyrica increased a week ago, also on requip (held)  Will decrease Lyrica dose & held dose last night  -Attempted jhon hallpike but could not perform since pt having difficulty with task  Orthostatics neg, BP soft at baseline  IV fluids overnight  R/O stroke with MRI and MRA head and neck  Allow for permissive HTN until stroke r/o  Just had an ECHO yesterday at Pleitez's office and saw cardiology before this occurred, according to pt; not re-ordered  Neuro consult-appreciate recs  Cardiology consult-appreciate recs  Stroke workup-f/U  Neuro checks  Cardiac diet  Tele  PT/OT  Passed bedside swallow, no need for speech eval    Neuropathy (Chemo Induced)  Cymbalta, Lyrica & Requip  Held requip and plan to decrease lyrica since recently increased and recent worsening dizziness    High Fall Risk  Fall Precautions    Knee and Wrist Pain & Abrasions  Supportive care    IDDM with Gastroparesis  Lantus, added sliding scale, jardiance  Holding metformin  Continue reglan    Large B-cell lymphoma   -In remission  Continue Acyclovir    HTN/CAD/HPLD  Aspirin, statin, Vascepa  No BP meds, BP controlled and low    GERD  PPI    Gout  Allopurinol    ZACK  -doesn't wear CPAP    Dementia with Anxiety/Depression  Aricept, Amitriptyline  Supportive care    DVT PX  SCDs, Lovenox    Belem Skelton, APRN-CNP  76 min spent interviewing and assessing patient, placing admission orders, updating MAR.

## 2024-07-16 NOTE — ED PROVIDER NOTES
HPI   Chief Complaint   Patient presents with    Syncope     Pt was walking in his PCP and became dizzy and passed out on the concrete. Pt c/o rt knee and rt wrist pain. Pt has an abrasion to knee and wrist. Pt has not had much fluids today.       Patient is a 79-year-old male presenting to the ED with cc of dizziness times today.  Patient states he has had dizzy episodes for the last couple weeks but progressively worsened today.  Patient states he was at the dentist getting fit for dentures when he walked out he felt dizzy and fell into a bush any tried to get up again and fell again.  Patient stated felt like he was off balance.  Patient states this has happened to him before at home and if he just lies down it will improve.  Patient states he has never been diagnosed with vertigo.  Patient states dizziness is worse with movement and ambulation.  Patient denies any lightheadedness sensation or near syncope sensation.  Patient does not believe he passed out.  Patient is not on blood thinners does believe he hit his head when he fell.  Patient denies any headache or vision changes.  Patient states he is only had breakfast today.  Patient denies any history of MI blood clots recent travel or surgery.  Patient states he does feel nauseous.  Patient states when he moved a certain way nausea came on with the dizziness.  Patient denies any fever chills congestion cough pharyngitis chest pain shortness of breath, abdominal pain, diarrhea, constipation, dysuria, numbness, tingling.  Patient is not up-to-date on tetanus.  Patient is a former smoker denies any alcohol or street drug abuse.              Patient History   Past Medical History:   Diagnosis Date    Back abscess 05/30/2023    Hallucinations, visual 05/30/2023     Past Surgical History:   Procedure Laterality Date    OTHER SURGICAL HISTORY  06/24/2021    Ankle surgery     No family history on file.  Social History     Tobacco Use    Smoking status: Former      Current packs/day: 0.00     Types: Cigarettes     Quit date:      Years since quittin.5    Smokeless tobacco: Never   Vaping Use    Vaping status: Never Used   Substance Use Topics    Alcohol use: Not on file    Drug use: Never       Physical Exam   ED Triage Vitals [24 1605]   Temperature Heart Rate Respirations BP   36 °C (96.8 °F) 89 16 105/79      Pulse Ox Temp src Heart Rate Source Patient Position   (!) 91 % -- -- --      BP Location FiO2 (%)     -- --       Physical Exam  HENT:      Head: Normocephalic.   Eyes:      Extraocular Movements: Extraocular movements intact.      Pupils: Pupils are equal, round, and reactive to light.   Cardiovascular:      Rate and Rhythm: Normal rate and regular rhythm.   Pulmonary:      Effort: Pulmonary effort is normal.      Breath sounds: No wheezing, rhonchi or rales.      Comments: diminished  Abdominal:      Palpations: Abdomen is soft.      Tenderness: There is no abdominal tenderness. There is no guarding or rebound.   Musculoskeletal:         General: No tenderness. Normal range of motion.      Cervical back: Normal range of motion. No tenderness.      Right lower leg: No edema.      Left lower leg: No edema.   Skin:     Findings: Lesion (Abrasion of the right wrist and right knee) present.   Neurological:      General: No focal deficit present.      Mental Status: He is alert and oriented to person, place, and time. Mental status is at baseline.      Cranial Nerves: No cranial nerve deficit.      Sensory: No sensory deficit.           ED Course & MDM   ED Course as of 24   Tue 2024   170 EKG interpretation performed at 410 normal sinus rhythm normal axis no acute signs of ischemia ventricular rate 87 bpm []      ED Course User Index  [] Jinny Jansen PA-C         Diagnoses as of 24   Dizziness   Nausea                       Tayla Coma Scale Score: 15                        Medical Decision Making  Medical  Decision Making:  Patient presented as described in HPI. Patient case including ROS, PE, and treatment and plan discussed with ED attending if attached as cosigner. Due to patients presentation orders completed include as documented.  Patient presenting today for dizziness.  Patient states he left the denies ever getting mold for dentures and walked out suddenly felt dizzy and off-balance.  Patient states he fell down attempted to get up and fell down again.  Patient denies any known LOC.  Patient states he is still intermittently experiencing the dizziness which is worse with movement.  Patient is nontoxic-appearing, no neurofocal deficits lung sounds diminished abdomen is soft and nontender.  Patient given Zofran fluids and meclizine for symptoms.  Patient updated on his tetanus.  Pending labs and imaging.  Labs are unremarkable imaging is negative.  Patient educated on these findings.  Patient educated on needing admission.  Pending response from the hospitalist.        Patient care discussed with: N/A  Social Determinants affecting care: N/A    Final diagnosis and disposition as below.  See CI    Hospitalize. I discussed the differential; results and admit plan with the patient and/or family/friend/caregiver if present.  I emphasized the importance of hospitalization need for re-evaluation/continued monitoring/interventions.. They agreed that if they feel their condition is worsening or if they have any other concern they should alert staff immediately for further assistance. I gave the patient an opportunity to ask all questions they had and answered all of them accordingly. The patient and/or family/friend/caregiver expressed understanding verbally and that they would comply.       Disposition:  admit    Hospitalize. Discussed findings and treatment done here in ED with admitting physician. It would be a risk to discharge the patient in their condition due to possibility of deterioration in their condition and  the need for urgent interventions.    This note has been transcribed using voice recognition and may contain grammatical errors, misplaced words, incorrect words, incorrect phrases or other errors.        Labs Reviewed   CBC WITH AUTO DIFFERENTIAL - Abnormal       Result Value    WBC 9.3      nRBC 0.0      RBC 5.09      Hemoglobin 15.3      Hematocrit 46.1      MCV 91      MCH 30.1      MCHC 33.2      RDW 13.6      Platelets 171      Neutrophils % 52.9      Immature Granulocytes %, Automated 0.5      Lymphocytes % 37.0      Monocytes % 6.8      Eosinophils % 2.4      Basophils % 0.4      Neutrophils Absolute 4.89      Immature Granulocytes Absolute, Automated 0.05      Lymphocytes Absolute 3.42 (*)     Monocytes Absolute 0.63      Eosinophils Absolute 0.22      Basophils Absolute 0.04     COMPREHENSIVE METABOLIC PANEL - Abnormal    Glucose 179 (*)     Sodium 137      Potassium 4.3      Chloride 100      Bicarbonate 23      Anion Gap 18      Urea Nitrogen 17      Creatinine 0.84      eGFR 90      Calcium 9.2      Albumin 4.2      Alkaline Phosphatase 64      Total Protein 7.0      AST 43 (*)     Bilirubin, Total 0.7      ALT 41     MAGNESIUM - Normal    Magnesium 2.06     B-TYPE NATRIURETIC PEPTIDE - Normal    BNP 13      Narrative:        <100 pg/mL - Heart failure unlikely  100-299 pg/mL - Intermediate probability of acute heart                  failure exacerbation. Correlate with clinical                  context and patient history.    >=300 pg/mL - Heart Failure likely. Correlate with clinical                  context and patient history.    BNP testing is performed using different testing methodology at [facility] than at other [facility] hospitals. Direct result comparisons should only be made within the same method.      SERIAL TROPONIN-INITIAL - Normal    Troponin I, High Sensitivity 3      Narrative:     Less than 99th percentile of normal range cutoff-  Female and children under 18 years old <14  ng/L; Male <21 ng/L: Negative  Repeat testing should be performed if clinically indicated.     Female and children under 18 years old 14-50 ng/L; Male 21-50 ng/L:  Consistent with possible cardiac damage and possible increased clinical   risk. Serial measurements may help to assess extent of myocardial damage.     >50 ng/L: Consistent with cardiac damage, increased clinical risk and  myocardial infarction. Serial measurements may help assess extent of   myocardial damage.      NOTE: Children less than 1 year old may have higher baseline troponin   levels and results should be interpreted in conjunction with the overall   clinical context.     NOTE: Troponin I testing is performed using a different   testing methodology at [facility] than at other   [facility]. Direct result comparisons should only   be made within the same method.   URINALYSIS WITH REFLEX CULTURE AND MICROSCOPIC    Narrative:     The following orders were created for panel order Urinalysis with Reflex Culture and Microscopic.  Procedure                               Abnormality         Status                     ---------                               -----------         ------                     Urinalysis with Reflex C...[[id number]]                                                 Extra Urine Gray Tube[[id number]]                                                         Please view results for these tests on the individual orders.   TROPONIN SERIES- (INITIAL, 1 HR)    Narrative:     The following orders were created for panel order Troponin I Series, High Sensitivity (0, 1 HR).  Procedure                               Abnormality         Status                     ---------                               -----------         ------                     Troponin I, High Sensiti...[[id number]]  Normal              Final result               Troponin, High Sensitivi...[[id number]]                      In process                   Please  view results for these tests on the individual orders.   URINALYSIS WITH REFLEX CULTURE AND MICROSCOPIC   EXTRA URINE GRAY TUBE   SERIAL TROPONIN, 1 HOUR      CT head wo IV contrast   Final Result   No acute intracranial abnormality.        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                  MACRO:   None        Signed by: Walter Collier 7/16/2024 6:49 PM   Dictation workstation:   [id number]      CT cervical spine wo IV contrast   Final Result   No acute intracranial abnormality.        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  No evidence of acute cardiopulmonary process.                  MACRO:   None        Signed by: Bryant Sanchez 7/16/2024 5:02 PM   Dictation workstation:   [id number]         Procedure  Procedures     Jinny Jansen PA-C  07/16/24 1904

## 2024-07-16 NOTE — ED TRIAGE NOTES
Pt was walking in his PCP and became dizzy and passed out on the concrete. Pt c/o rt knee and rt wrist pain. Pt has an abrasion to knee and wrist. Pt has not had much fluids today.

## 2024-07-17 VITALS
SYSTOLIC BLOOD PRESSURE: 108 MMHG | WEIGHT: 220.8 LBS | DIASTOLIC BLOOD PRESSURE: 73 MMHG | HEIGHT: 67 IN | TEMPERATURE: 97.7 F | BODY MASS INDEX: 34.65 KG/M2 | HEART RATE: 81 BPM | RESPIRATION RATE: 12 BRPM | OXYGEN SATURATION: 97 %

## 2024-07-17 PROBLEM — R42 DIZZINESS: Status: RESOLVED | Noted: 2023-05-30 | Resolved: 2024-07-17

## 2024-07-17 LAB
ANION GAP SERPL CALC-SCNC: 14 MMOL/L (ref 10–20)
ATRIAL RATE: 87 BPM
BUN SERPL-MCNC: 16 MG/DL (ref 6–23)
CALCIUM SERPL-MCNC: 8.4 MG/DL (ref 8.6–10.3)
CHLORIDE SERPL-SCNC: 101 MMOL/L (ref 98–107)
CHOLEST SERPL-MCNC: 178 MG/DL (ref 0–199)
CHOLESTEROL/HDL RATIO: 4.7
CO2 SERPL-SCNC: 27 MMOL/L (ref 21–32)
CREAT SERPL-MCNC: 0.74 MG/DL (ref 0.5–1.3)
EGFRCR SERPLBLD CKD-EPI 2021: >90 ML/MIN/1.73M*2
GLUCOSE BLD MANUAL STRIP-MCNC: 129 MG/DL (ref 74–99)
GLUCOSE BLD MANUAL STRIP-MCNC: 217 MG/DL (ref 74–99)
GLUCOSE SERPL-MCNC: 127 MG/DL (ref 74–99)
HDLC SERPL-MCNC: 37.5 MG/DL
HOLD SPECIMEN: NORMAL
LDLC SERPL CALC-MCNC: ABNORMAL MG/DL
NON HDL CHOLESTEROL: 141 MG/DL (ref 0–149)
P AXIS: 68 DEGREES
P OFFSET: 174 MS
P ONSET: 127 MS
POTASSIUM SERPL-SCNC: 3.8 MMOL/L (ref 3.5–5.3)
PR INTERVAL: 176 MS
Q ONSET: 215 MS
QRS COUNT: 14 BEATS
QRS DURATION: 78 MS
QT INTERVAL: 356 MS
QTC CALCULATION(BAZETT): 428 MS
QTC FREDERICIA: 402 MS
R AXIS: -21 DEGREES
SODIUM SERPL-SCNC: 138 MMOL/L (ref 136–145)
T AXIS: 49 DEGREES
T OFFSET: 393 MS
TRIGL SERPL-MCNC: 516 MG/DL (ref 0–149)
VENTRICULAR RATE: 87 BPM
VLDL: ABNORMAL

## 2024-07-17 PROCEDURE — 83036 HEMOGLOBIN GLYCOSYLATED A1C: CPT | Mod: GEALAB | Performed by: NURSE PRACTITIONER

## 2024-07-17 PROCEDURE — 36415 COLL VENOUS BLD VENIPUNCTURE: CPT | Performed by: NURSE PRACTITIONER

## 2024-07-17 PROCEDURE — 97165 OT EVAL LOW COMPLEX 30 MIN: CPT | Mod: GO

## 2024-07-17 PROCEDURE — 99222 1ST HOSP IP/OBS MODERATE 55: CPT | Performed by: PSYCHIATRY & NEUROLOGY

## 2024-07-17 PROCEDURE — 80061 LIPID PANEL: CPT | Performed by: NURSE PRACTITIONER

## 2024-07-17 PROCEDURE — 2500000001 HC RX 250 WO HCPCS SELF ADMINISTERED DRUGS (ALT 637 FOR MEDICARE OP): Performed by: NURSE PRACTITIONER

## 2024-07-17 PROCEDURE — 97161 PT EVAL LOW COMPLEX 20 MIN: CPT | Mod: GP

## 2024-07-17 PROCEDURE — 2500000002 HC RX 250 W HCPCS SELF ADMINISTERED DRUGS (ALT 637 FOR MEDICARE OP, ALT 636 FOR OP/ED): Performed by: NURSE PRACTITIONER

## 2024-07-17 PROCEDURE — 94760 N-INVAS EAR/PLS OXIMETRY 1: CPT

## 2024-07-17 PROCEDURE — G0378 HOSPITAL OBSERVATION PER HR: HCPCS

## 2024-07-17 PROCEDURE — 80048 BASIC METABOLIC PNL TOTAL CA: CPT | Performed by: NURSE PRACTITIONER

## 2024-07-17 PROCEDURE — 82947 ASSAY GLUCOSE BLOOD QUANT: CPT | Mod: 59

## 2024-07-17 PROCEDURE — 82947 ASSAY GLUCOSE BLOOD QUANT: CPT | Mod: 59,91

## 2024-07-17 RX ORDER — PREGABALIN 225 MG/1
225 CAPSULE ORAL 2 TIMES DAILY
Qty: 60 CAPSULE | Refills: 0 | Status: SHIPPED | OUTPATIENT
Start: 2024-07-17 | End: 2024-08-16

## 2024-07-17 RX ORDER — PREGABALIN 75 MG/1
225 CAPSULE ORAL 2 TIMES DAILY
Status: DISCONTINUED | OUTPATIENT
Start: 2024-07-17 | End: 2024-07-17 | Stop reason: HOSPADM

## 2024-07-17 RX ORDER — ACETAMINOPHEN 325 MG/1
650 TABLET ORAL 3 TIMES DAILY PRN
Status: DISCONTINUED | OUTPATIENT
Start: 2024-07-17 | End: 2024-07-17 | Stop reason: HOSPADM

## 2024-07-17 RX ORDER — ACETAMINOPHEN 650 MG/1
650 SUPPOSITORY RECTAL 3 TIMES DAILY PRN
Status: DISCONTINUED | OUTPATIENT
Start: 2024-07-17 | End: 2024-07-17 | Stop reason: HOSPADM

## 2024-07-17 RX ORDER — BACITRACIN ZINC 500 UNIT/G
OINTMENT IN PACKET (EA) TOPICAL DAILY
Status: DISCONTINUED | OUTPATIENT
Start: 2024-07-17 | End: 2024-07-17 | Stop reason: HOSPADM

## 2024-07-17 RX ORDER — ROPINIROLE 0.25 MG/1
0.25 TABLET, FILM COATED ORAL NIGHTLY
Qty: 30 TABLET | Refills: 0 | Status: SHIPPED | OUTPATIENT
Start: 2024-07-17 | End: 2024-08-16

## 2024-07-17 RX ORDER — ACETAMINOPHEN 160 MG/5ML
650 SOLUTION ORAL 3 TIMES DAILY PRN
Status: DISCONTINUED | OUTPATIENT
Start: 2024-07-17 | End: 2024-07-17 | Stop reason: HOSPADM

## 2024-07-17 ASSESSMENT — ENCOUNTER SYMPTOMS
FEVER: 0
EYE REDNESS: 0
CHILLS: 0
NUMBNESS: 1
EYE ITCHING: 0
SPEECH DIFFICULTY: 0
COUGH: 0
PALPITATIONS: 0
DYSURIA: 0
WEAKNESS: 1
CONFUSION: 1
NAUSEA: 0
CONSTIPATION: 0
ABDOMINAL DISTENTION: 0
LIGHT-HEADEDNESS: 0
SORE THROAT: 0
MYALGIAS: 1
VOMITING: 0
DIZZINESS: 1
DIARRHEA: 0
POLYDIPSIA: 0
COLOR CHANGE: 0
WEAKNESS: 0
ARTHRALGIAS: 1
SHORTNESS OF BREATH: 0
HEADACHES: 0
FACIAL ASYMMETRY: 0
ABDOMINAL PAIN: 0
TREMORS: 0

## 2024-07-17 ASSESSMENT — ACTIVITIES OF DAILY LIVING (ADL)
ADL_ASSISTANCE: INDEPENDENT
BATHING_ASSISTANCE: STAND BY
LACK_OF_TRANSPORTATION: NO
ADLS_ADDRESSED: YES

## 2024-07-17 ASSESSMENT — COGNITIVE AND FUNCTIONAL STATUS - GENERAL
WALKING IN HOSPITAL ROOM: A LITTLE
DAILY ACTIVITIY SCORE: 22
MOVING FROM LYING ON BACK TO SITTING ON SIDE OF FLAT BED WITH BEDRAILS: A LITTLE
DRESSING REGULAR LOWER BODY CLOTHING: A LITTLE
CLIMB 3 TO 5 STEPS WITH RAILING: A LITTLE
MOBILITY SCORE: 18
MOBILITY SCORE: 24
TURNING FROM BACK TO SIDE WHILE IN FLAT BAD: A LITTLE
DAILY ACTIVITIY SCORE: 24
HELP NEEDED FOR BATHING: A LITTLE
STANDING UP FROM CHAIR USING ARMS: A LITTLE
MOVING TO AND FROM BED TO CHAIR: A LITTLE

## 2024-07-17 ASSESSMENT — PAIN SCALES - GENERAL
PAINLEVEL_OUTOF10: 0 - NO PAIN
PAINLEVEL_OUTOF10: 0 - NO PAIN
PAINLEVEL_OUTOF10: 3
PAINLEVEL_OUTOF10: 0 - NO PAIN

## 2024-07-17 ASSESSMENT — PAIN DESCRIPTION - LOCATION: LOCATION: WRIST

## 2024-07-17 ASSESSMENT — PAIN - FUNCTIONAL ASSESSMENT
PAIN_FUNCTIONAL_ASSESSMENT: 0-10
PAIN_FUNCTIONAL_ASSESSMENT: 0-10

## 2024-07-17 ASSESSMENT — PAIN DESCRIPTION - ORIENTATION: ORIENTATION: RIGHT

## 2024-07-17 NOTE — PROGRESS NOTES
"Jossue Tam is a 79 y.o. male on day 0 of admission presenting with Dizziness.    Subjective   He is 79 years old man with past medical history of insulin-dependent diabetes, hypertension, dementia, chemo-induced neuropathy, anxiety and depression, B-cell lymphoma in remission who presented to the ED for dizziness for the past 2 weeks and complicated so with fall.  He passed out   Recently he had decreased the dose of Lyrica to improve her chemotherapy-induce neuropathy.  He is also is taking Cymbalta and amitriptyline.   Patient reports that pain, itchiness, burning had controlled with this medications.   He feels that he had more \"lightheadedness and balance off during the day \",gait was directed towards the right side.  That is related only with movement. He saw cardiology before this episodes happened.   Objective     Physical Exam  Eyes:      General: No visual field deficit.  Neurological:      Mental Status: He is alert and oriented to person, place, and time.      Cranial Nerves: Cranial nerves 2-12 are intact. No cranial nerve deficit, dysarthria or facial asymmetry.      Motor: No tremor, abnormal muscle tone, seizure activity or pronator drift.      Coordination: Romberg sign positive. Coordination normal. Finger-Nose-Finger Test normal.      Gait: Gait abnormal.      Deep Tendon Reflexes: Babinski sign absent on the right side. Babinski sign absent on the left side.      Reflex Scores:       Tricep reflexes are 0 on the right side and 0 on the left side.       Bicep reflexes are 0 on the right side and 0 on the left side.       Patellar reflexes are 0 on the right side and 0 on the left side.       Achilles reflexes are 0 on the right side and 0 on the left side.     Comments: Sensation abnormal, decreased pinprick sensation on the feet in the hands.           Last Recorded Vitals  Blood pressure 108/69, pulse 82, temperature 36.4 °C (97.5 °F), temperature source Temporal, resp. rate 18, height 1.702 " "m (5' 7\"), weight 100 kg (220 lb 12.8 oz), SpO2 91%.  Intake/Output last 3 Shifts:  I/O last 3 completed shifts:  In: - (0 mL/kg)   Out: 1100 (11 mL/kg) [Urine:1100 (0.3 mL/kg/hr)]  Weight: 100.2 kg     Relevant Results  Ct scan showed no acute intracranial hemorrhages or lesion.      Assessment/Plan He is 79 years old man with past medical history of insulin-dependent diabetes, hypertension, dementia, chemo-induced neuropathy, anxiety and depression, B-cell lymphoma in remission who was admitted to [facility] due to dizziness/lightheaded for the past 2 weeks    # Lightheadedness this can be due to polypharmacy versus neuropathy(due to diabetes or side effect chemotherapy) versus cardiac origin(arhythmia) vs orthostatic hypotension  Based on the symptoms and physical exam I do not think patient is having stroke/TIA.  -Orthostatic hypotension was ruled out during the hospitalization by previous provider  -Recommended physical therapy for balance as outpatient  -Patient would benefit from reducing the medication that he is using for neuropathy, reduce amitriptyline 25 mg(from 50 mg). Also here was reduced Lyrica to 225 mg.  If the symptoms are resolved with these changing medication then we will continue with his dose.   Follow-up with neurologist as outpatient  -I recommend cardiac monitor and follow-up with his cardiologist as outpatient.     Dispo-patient can be discharged per medical team.     I discussed my plan with my attending Dr. Sherita Magallanes MD  Family medicine resident  PGY 3    "

## 2024-07-17 NOTE — CARE PLAN
Problem: General Stroke  Goal: Demonstrate improvement in neurological exam throughout the shift  Outcome: Met    The clinical goals for the shift include No dizziness throughout shift    Medication changes promoted less dizzy episodes for patient. Discharged home with wife and HHC.

## 2024-07-17 NOTE — PROGRESS NOTES
Physical Therapy    Physical Therapy Evaluation    Patient Name: Jossue Tam  MRN: [id number]  Today's Date: 7/17/2024   Time Calculation  Start Time: 0956  Stop Time: 1009  Time Calculation (min): 13 min    Assessment/Plan   PT Assessment: Decreased endurance, Impaired balance, Decreased mobility, Decreased safety awareness, Decreased skin integrity  Rehab Prognosis: Good  Barriers to Discharge: None  Evaluation/Treatment Tolerance: Patient tolerated treatment well  Medical Staff Made Aware: Yes  End of Session Communication: Bedside nurse  Assessment Comment: Pt is a 78yo male presenting for PT evaluation with cc of dizziness and falls. Pt currently performs transfers and ambulation with light CGA and no assistive device. Noted 1 minor LOB in hallway, so recommended pt use ww or cane for short-term until strength and balance has fully recovered. Pt verbalized understanding and agreeable to use. Recommend low intensity PT at discharge for home safety assessment and mobility progression.   End of Session Patient Position: Bed, 2 rail up, Alarm off, not on at start of session    IP OR SWING BED PT PLAN  Inpatient or Swing Bed: Inpatient  PT Plan  Treatment/Interventions: Bed mobility, Transfer training, Gait training, Stair training  PT Plan: PT Eval only  PT Eval Only Reason: Safe to return home  PT Frequency: PT eval only  PT Discharge Recommendations: Low intensity level of continued care  Equipment Recommended upon Discharge: Wheeled walker, Straight cane (pt owns both devices)  PT Recommended Transfer Status: Contact guard (with ww)  PT - OK to Discharge: Yes (per PT POC)      Subjective   General Visit Information:  General  Reason for Referral: Pt is a 78yo male who presented to [facility] ED on 7/16/24 with cc of dizziness and falls. Pt was at the dentist getting fit for dentures and when he walked out, felt dizzy and fell into a bush. Pt was unable to get up independently so EMS was called. Labs unremarkable.  "MRI brain and MRA head/neck (pending).  Past Medical History Relevant to Rehab: HTN, DM, B cell lymphoma (in remission), chemo induced neuropathy, L ankle replacement  Family/Caregiver Present: No  Co-Treatment: OT  Co-Treatment Reason: to maximize pt safety and participation  Prior to Session Communication: Bedside nurse  Patient Position Received: Bed, 2 rail up, Alarm off, not on at start of session  General Comment: Pt pleasantly agreeable to PT eval.  Home Living:  Home Living  Type of Home: House  Lives With: Spouse  Home Adaptive Equipment: Walker rolling or standard, Cane, Long-handled shoehorn  Home Layout: One level  Home Access: Stairs to enter without rails  Entrance Stairs-Number of Steps: 1 (6\" threshold entry thru garage)  Bathroom Shower/Tub: Walk-in shower, Tub/shower unit  Bathroom Toilet: Handicapped height  Bathroom Equipment: Grab bars around toilet, Built-in shower seat, Grab bars in shower  Home Living Comments: Pt primarily uses WIS for bathing. Non-slip mats in place iutside of tub.  Prior Level of Function:  Prior Function Per Pt/Caregiver Report  Level of [place]: Independent with ADLs and functional transfers, Independent with homemaking with ambulation  Receives Help From: Family  Prior Function Comments: Pt reports amb IND without AD. Completes ADLs mostly IND, with occasional assist for LB dressing from wife. +Falls history (reason for admission - pt reports sudden onset of dizziness resulting in fall, landing on R knee and R wrist; imaging negative for fx. +Head strike. No LOC. Denies history of vertigo and associated sx of headache, nausea, or vision changes).  Precautions:  Precautions  Medical Precautions: Fall precautions  Precautions Comment: Tele, IV (powered off, RN notified)  Vital Signs:  Vital Signs  Heart Rate: 82  Heart Rate Source: Monitor  Patient Position: Sitting    Objective   Pain:  Pain Assessment  Pain Assessment: 0-10  0-10 (Numeric) Pain Score: 0 - No " pain  Pain Frequency: Intermittent  Clinical Progression: Gradually improving  Pain Interventions: Repositioned, Ambulation/increased activity  Response to Interventions: No change; pain-free during session  Cognition:  Cognition  Orientation Level: Oriented X4    General Assessments:  General Observation  General Observation: Scrape with Mepilex bandage in place at R wrist and R knee. Minor swelling noted around patellar region (med>lat). No tenderness noted.               Activity Tolerance  Endurance: Tolerates less than 10 min exercise, no significant change in vital signs    Static Sitting Balance  Static Sitting-Balance Support: Feet unsupported, No upper extremity supported  Static Sitting-Level of Assistance: Independent    Static Standing Balance  Static Standing-Balance Support: No upper extremity supported  Static Standing-Level of Assistance: Close supervision  Functional Assessments:  ADL  ADL's Addressed: Yes  LE Dressing Assistance: Minimal  LE Dressing Deficit: Don/doff R sock, Don/doff L sock    Bed Mobility  Bed Mobility: Yes  Bed Mobility 1  Bed Mobility 1: Supine to sitting, Sitting to supine  Level of Assistance 1: Close supervision  Bed Mobility Comments 1: HOB elevated; increased time to achieve upright sitting; cues for breath sequencing    Transfers  Transfer: Yes  Transfer 1  Technique 1: Sit to stand, Stand to sit  Transfer Level of Assistance 1: Contact guard  Trials/Comments 1: mildly unsteady upon standing, light CGA for steadying    Ambulation/Gait Training  Ambulation/Gait Training Performed: Yes  Ambulation/Gait Training 1  Device 1: No device  Assistance 1: Contact guard  Comments/Distance (ft) 1: 100' in hallway; LLE with decreased heel strike/step length/push off due to h/o L ankle replacement; 1 minor lateral LOB requiring CGA to recover; + Trendelenburg; cues for safety with turns  Extremity/Trunk Assessments:  RLE   RLE : Exceptions to WFL  AROM RLE (degrees)  RLE AROM Comment:  R knee ROM WNL and pain-free  Strength RLE  RLE Overall Strength: Within Functional Limits - strength 5/5  LLE   LLE : Exceptions to WFL  AROM LLE (degrees)  LLE AROM Comment: L knee ROM WNL and pain-free  Strength LLE  LLE Overall Strength: Within Functional Limits - strength 5/5  Outcome Measures:  [facility] Basic Mobility  Turning from your back to your side while in a flat bed without using bedrails: A little  Moving from lying on your back to sitting on the side of a flat bed without using bedrails: A little  Moving to and from bed to chair (including a wheelchair): A little  Standing up from a chair using your arms (e.g. wheelchair or bedside chair): A little  To walk in hospital room: A little  Climbing 3-5 steps with railing: A little  Basic Mobility - Total Score: 18    Encounter Problems       Encounter Problems (Active)       Pain - Adult              Education Documentation  Mobility Training, taught by Laura Arthur, PT at 7/17/2024 11:47 AM.  Learner: Patient  Readiness: Acceptance  Method: Explanation, Demonstration  Response: Verbalizes Understanding, Demonstrated Understanding    Education Comments  Role of acute care PT, POC, discharge recs. Hospital safety (use of call light to address needs, staff assist for all OOB mobility to reduce risk of falls). Importance of mobility in recovery process.

## 2024-07-17 NOTE — PROGRESS NOTES
Occupational Therapy    Evaluation    Patient Name: Jossue Tam  MRN: [id number]  Today's Date: 7/17/2024  Time Calculation  Start Time: 0955  Stop Time: 1011  Time Calculation (min): 16 min    Assessment  IP OT Assessment  OT Assessment: Pt. presents with ADL and mobility near his functional baseline. No further acute skilled OT services identified. Recommend home OT for ensured safety and independence.  Prognosis: Good  Barriers to Discharge: None  Evaluation/Treatment Tolerance: Patient tolerated treatment well  Medical Staff Made Aware: Yes  End of Session Communication: Bedside nurse  End of Session Patient Position: Bed, 3 rail up, Alarm off, not on at start of session (Nursing staff aware of alarm status. Alarm not necessary.)  Plan:  No Skilled OT: No acute OT goals identified  OT Frequency: OT eval only  OT Discharge Recommendations: Low intensity level of continued care  Equipment Recommended upon Discharge: Wheeled walker (Owns)  OT Recommended Transfer Status: Stand by assist  OT - OK to Discharge: Yes (Per OT POC)    Subjective   Current Problem:  1. Dizziness  CANCELED: Transthoracic Echo (TTE) Complete    CANCELED: Transthoracic Echo (TTE) Complete      2. Nausea        3. Postural dizziness with near syncope  CANCELED: Transthoracic Echo (TTE) Complete    CANCELED: Transthoracic Echo (TTE) Complete        General:  General  Reason for Referral: Pt. is a 79 yr old male admitted for dizziness, nausea, and postural dizziness with near syncope. Pt. referred to OT for impaired ADL and mobility.  Past Medical History Relevant to Rehab: HTN, DM, B cell lymphoma (in remission), chemo induced neuropathy, L ankle replacement  Family/Caregiver Present: No  Co-Treatment: PT  Co-Treatment Reason: To maximize pt. safety and functional outcomes.  Prior to Session Communication: Bedside nurse  Patient Position Received: Bed, 3 rail up, Alarm off, not on at start of session (Nursing staff aware of alarm  status.)  General Comment: Pt. pleasant and agreeable to participate in therapy evaluation.  Precautions:  Medical Precautions: Fall precautions (Aspiration precautions, skin care precautions, tele, IV (powered off))  Vital Signs:  Heart Rate: 82  Patient Position: Sitting  Pain:  Pain Assessment  Pain Assessment: 0-10  0-10 (Numeric) Pain Score: 0 - No pain  Pain Type: Acute pain (Pt, reports soreness with R wrist)  Pain Location: Wrist  Pain Orientation: Right  Pain Interventions: Repositioned, Ambulation/increased activity  Response to Interventions: Pt. resting comfortably in bed at end of session.    Objective   Cognition:  Overall Cognitive Status: Within Functional Limits  Orientation Level: Oriented X4           Home Living:  Type of Home: House  Lives With: Spouse  Home Adaptive Equipment: Walker rolling or standard, Cane, Long-handled shoehorn (Pt. reports that he does not use any devices, but wife owns due to previous sx.)  Home Layout: One level  Home Access: Stairs to enter without rails  Entrance Stairs-Rails: None  Entrance Stairs-Number of Steps: 1  Bathroom Shower/Tub: Tub/shower unit, Walk-in shower  Bathroom Toilet: Handicapped height  Bathroom Equipment: Built-in shower seat, Grab bars in shower, Grab bars around toilet (Non-slip mats)  Home Living Comments: Pt. reports to primarily using walk-in shower for bathing.   Prior Function:  Level of [place]: Independent with ADLs and functional transfers, Independent with homemaking with ambulation  Receives Help From: Family (Pt. reports that spouse will provide assistance if needed)  ADL Assistance: Independent  Homemaking Assistance: Independent  Ambulatory Assistance: Independent  Prior Function Comments: Pt. reports that spouse will occasionally provide assistance with LB dressing. He reports to using a long-handled shoehorn when donning shoes.    ADL:  Eating Assistance: Independent  Grooming Assistance: Independent  Bathing Assistance:  Stand by  Bathing Deficit: Supervision/safety  UE Dressing Assistance: Modified independent (Device)  UE Dressing Deficit: Setup  LE Dressing Assistance: Minimal  LE Dressing Deficit: Setup, Don/doff R sock, Don/doff L sock (Pt. reports that spouse will occasionally help with donning socks. Pt. unable to complete donning of socks at this time, and requested therapists assistance.)  Toileting Assistance with Device: Stand by  Toileting Deficit: Supervison/safety  ADL Comments: Anticipate varying levels of assist with completion of dual tasks.  Activity Tolerance:  Endurance: Tolerates less than 10 min exercise, no significant change in vital signs  Bed Mobility/Transfers: Bed Mobility  Bed Mobility: Yes  Bed Mobility 1  Bed Mobility 1: Supine to sitting, Sitting to supine  Level of Assistance 1: Close supervision  Bed Mobility Comments 1: Increased time to complete bed mobility. Pt. able to obtain an upright seated position at EOB.    Transfers  Transfer: Yes  Transfer 1  Transfer From 1: Bed to  Transfer to 1: Bed  Technique 1: Sit to stand, Stand to sit  Transfer Level of Assistance 1: Contact guard      Functional Mobility:  Functional Mobility  Functional Mobility Performed: Yes  Functional Mobility 1  Surface 1: Level tile  Device 1: No device  Assistance 1: Contact guard  Comments 1: Pt. with slight unsteadiness with completion of mobility throughout hallway and room. Pt. educated on use of FWW or cane to assist with balance during mobility.  Sitting Balance:  Static Sitting Balance  Static Sitting-Balance Support: No upper extremity supported, Feet supported  Static Sitting-Level of Assistance: Close supervision  Static Sitting-Comment/Number of Minutes: Pt. able to maintain good static sitting balance for approximately 3-4 minutes while seated EOB.  Standing Balance:  Static Standing Balance  Static Standing-Balance Support: No upper extremity supported  Static Standing-Level of Assistance: Contact  guard    Sensation:  Sensation Comment: Per EMR report, pt. with chemo induced neuropathy.  Strength:  Strength Comments: BUE functionally at baseline.    Extremities: RUE   RUE : Within Functional Limits and LUE   LUE:  (Limited overhead ROM. Pt. reports this is chronic.)    Outcome Measures: [facility] Daily Activity  Putting on and taking off regular lower body clothing: A little  Bathing (including washing, rinsing, drying): A little  Putting on and taking off regular upper body clothing: None  Toileting, which includes using toilet, bedpan or urinal: None  Taking care of personal grooming such as brushing teeth: None  Eating Meals: None  Daily Activity - Total Score: 22      Education Documentation  Body Mechanics, taught by CINTHYA Deutsch at 7/17/2024 11:54 AM.  Learner: Patient  Readiness: Acceptance  Method: Explanation, Demonstration  Response: Verbalizes Understanding

## 2024-07-17 NOTE — CARE PLAN
Problem: Pain - Adult  Goal: Verbalizes/displays adequate comfort level or baseline comfort level  7/17/2024 0452 by Sarah Cox RN  Outcome: Progressing  7/17/2024 0446 by Sarah Cox RN  Outcome: Progressing     Problem: Safety - Adult  Goal: Free from fall injury  7/17/2024 0452 by Sarah Cox RN  Outcome: Progressing  7/17/2024 0446 by Sarah Cox RN  Outcome: Progressing     Problem: Discharge Planning  Goal: Discharge to home or other facility with appropriate resources  Outcome: Progressing     Problem: General Stroke  Goal: Maintain BP within ordered limits throughout shift  Outcome: Progressing  Goal: Participate in treatment (ie., meds, therapy) throughout shift  Outcome: Progressing  Goal: Out of bed three times today  Outcome: Progressing    The clinical goals for the shift include pt will remain stable and safe through the shift.    Over the shift, the patient did make progress toward the following goals. Recommendations to address these barriers include bed alarm set, call light in reach frequent neuro and vital sign checks during the shift.

## 2024-07-17 NOTE — PROGRESS NOTES
07/17/24 0838   Discharge Planning   Living Arrangements Spouse/significant other   Support Systems Spouse/significant other   Assistance Needed A&OX3; independent with ADLs with no DME; drives; room air baseline and currently room air   Type of Residence Private residence   Number of Stairs to Enter Residence 1   Number of Stairs Within Residence 0   Do you have animals or pets at home? No   Who is requesting discharge planning? Provider   Expected Discharge Disposition Home  (Patient denies any home going needs at this time)   Does the patient need discharge transport arranged? No   Financial Resource Strain   How hard is it for you to pay for the very basics like food, housing, medical care, and heating? Not hard   Housing Stability   In the last 12 months, was there a time when you were not able to pay the mortgage or rent on time? N   In the past 12 months, how many times have you moved where you were living? 1   At any time in the past 12 months, were you homeless or living in a shelter (including now)? N   Transportation Needs   In the past 12 months, has lack of transportation kept you from medical appointments or from getting medications? no   In the past 12 months, has lack of transportation kept you from meetings, work, or from getting things needed for daily living? No        07/17/24 1400   Discharge Planning   Expected Discharge Disposition Home  (Pt is aware of dc today. Patient denies any home going needs. Pt wife bedside to drive pt home once dc completed.)

## 2024-07-17 NOTE — NURSING NOTE
Discharge instructions reviewed with patient and wife. Educated on stopping some medications and lowering dosage of others. Follow up appointments noted. IV removed, intact. Telemetry returned. No additional questions.

## 2024-07-17 NOTE — CONSULTS
Inpatient consult to Cardiology  Consult performed by: NANCY Britt-CNP  Consult ordered by: NANCY Gonzales-CNP  Reason for consult: dizziness, syncope          History Of Present Illness  Jossue Tam is a 79 y.o. male presenting with dizziness and syncope. He was just at Dr. Pleitez's office yesterday where he was seen for his echo and carotid US results. He states he then left his dentist appt and got dizzy and fell. He thinks he passed out but is not sure. He tried to get up and fell again. He denies any palpitations, shortness of breath or chest pain.     Lab work in the ER showed glucose 179, sodium 137, potassium 4.3, BUN/creatinine 17/0.84, magnesium 2.0, BNP 13, troponin negative, WBCs 9.3, H&H 15.3/46.1, UA negative, CT of the cervical spine negative, CT of the head negative, chest x-ray negative, right knee showed no acute abnormality, right wrist showed multifocal osteoarthritis.    EKG showed SR with possible old inferior infarct.       Past Medical History  Past Medical History:   Diagnosis Date    Back abscess 05/30/2023    Hallucinations, visual 05/30/2023   Diabetes mellitus type 2, hypertension, hyperlipidemia, LVH, aortic stenosis, PVD, lymphoma status post chemo    Surgical History  Past Surgical History:   Procedure Laterality Date    OTHER SURGICAL HISTORY  06/24/2021    Ankle surgery        Social History  He reports that he quit smoking about 41 years ago. His smoking use included cigarettes. He has never used smokeless tobacco. He reports that he does not use drugs. No history on file for alcohol use.    Family History  No family history on file.     Allergies  Naproxen    Review of Systems  Review of Systems   Constitutional:  Negative for chills and fever.   Respiratory:  Negative for cough and shortness of breath.    Cardiovascular:  Negative for chest pain, palpitations and leg swelling.   Gastrointestinal:  Negative for abdominal distention and abdominal pain.  "  Neurological:  Positive for dizziness and syncope. Negative for weakness.   All other systems reviewed and are negative.         Physical Exam  Constitutional: Well developed, awake/alert/oriented x3, no distress, alert and cooperative  Eyes: PERRL, EOMI, clear sclera  ENMT: mucous membranes moist, no apparent injury, no lesions seen  Head/Neck: Neck supple, no apparent injury, thyroid without mass or tenderness, No JVD, trachea midline, no bruits  Respiratory/Thorax: Patent airways, CTAB, normal breath sounds with good chest expansion, thorax symmetric  Cardiovascular: Regular, rate and rhythm, no murmurs, 2+ equal pulses of the extremities, normal S 1and S 2  Gastrointestinal: Nondistended, soft, non-tender, no rebound tenderness or guarding, no masses palpable, no organomegaly, +BS, no bruits  Musculoskeletal: ROM intact, no joint swelling, normal strength  Extremities: normal extremities, no cyanosis edema, contusions or wounds, no clubbing  Neurological: alert and oriented x3, intact senses, motor, response and reflexes, normal strength  Lymphatic: No significant lymphadenopathy  Psychological: Appropriate mood and behavior  Skin: Warm and dry, no lesions, no rashes       Last Recorded Vitals  Blood pressure 108/73, pulse 81, temperature 36.5 °C (97.7 °F), temperature source Temporal, resp. rate 12, height 1.702 m (5' 7\"), weight 100 kg (220 lb 12.8 oz), SpO2 97%.    Relevant Results    Scheduled medications  acyclovir, 400 mg, oral, BID  allopurinol, 100 mg, oral, Daily  amitriptyline, 50 mg, oral, Nightly  aspirin, 81 mg, oral, Daily  atorvastatin, 80 mg, oral, Nightly  bacitracin, , Topical, Daily  cholecalciferol, 2,000 Units, oral, Daily  docusate sodium, 100 mg, oral, BID  donepezil, 10 mg, oral, Nightly  DULoxetine, 60 mg, oral, Daily  empagliflozin, 25 mg, oral, Daily  enoxaparin, 40 mg, subcutaneous, q24h  icosapent ethyL, 2 g, oral, BID  insulin glargine, 25 Units, subcutaneous, q24h  insulin " lispro, 0-5 Units, subcutaneous, TID  [Held by provider] metFORMIN, 1,000 mg, oral, BID  metoclopramide, 5 mg, oral, TID  pantoprazole, 40 mg, oral, Daily before breakfast  pregabalin, 225 mg, oral, BID  [Held by provider] rOPINIRole, 0.25 mg, oral, Nightly  tamsulosin, 0.8 mg, oral, Daily      Continuous medications     PRN medications  PRN medications: acetaminophen **OR** acetaminophen **OR** acetaminophen, dextrose, dextrose, glucagon, glucagon, hydrALAZINE **FOLLOWED BY** [START ON 7/18/2024] hydrALAZINE, labetaloL, oxygen    Results for orders placed or performed during the hospital encounter of 07/16/24 (from the past 24 hour(s))   ECG 12 lead   Result Value Ref Range    Ventricular Rate 87 BPM    Atrial Rate 87 BPM    NV Interval 176 ms    QRS Duration 78 ms    QT Interval 356 ms    QTC Calculation(Bazett) 428 ms    P Axis 68 degrees    R Axis -21 degrees    T Axis 49 degrees    QRS Count 14 beats    Q Onset 215 ms    P Onset 127 ms    P Offset 174 ms    T Offset 393 ms    QTC Fredericia 402 ms   CBC and Auto Differential   Result Value Ref Range    WBC 9.3 4.4 - 11.3 x10*3/uL    nRBC 0.0 0.0 - 0.0 /100 WBCs    RBC 5.09 4.50 - 5.90 x10*6/uL    Hemoglobin 15.3 13.5 - 17.5 g/dL    Hematocrit 46.1 41.0 - 52.0 %    MCV 91 80 - 100 fL    MCH 30.1 26.0 - 34.0 pg    MCHC 33.2 32.0 - 36.0 g/dL    RDW 13.6 11.5 - 14.5 %    Platelets 171 150 - 450 x10*3/uL    Neutrophils % 52.9 40.0 - 80.0 %    Immature Granulocytes %, Automated 0.5 0.0 - 0.9 %    Lymphocytes % 37.0 13.0 - 44.0 %    Monocytes % 6.8 2.0 - 10.0 %    Eosinophils % 2.4 0.0 - 6.0 %    Basophils % 0.4 0.0 - 2.0 %    Neutrophils Absolute 4.89 1.60 - 5.50 x10*3/uL    Immature Granulocytes Absolute, Automated 0.05 0.00 - 0.50 x10*3/uL    Lymphocytes Absolute 3.42 (H) 0.80 - 3.00 x10*3/uL    Monocytes Absolute 0.63 0.05 - 0.80 x10*3/uL    Eosinophils Absolute 0.22 0.00 - 0.40 x10*3/uL    Basophils Absolute 0.04 0.00 - 0.10 x10*3/uL   Comprehensive metabolic  panel   Result Value Ref Range    Glucose 179 (H) 74 - 99 mg/dL    Sodium 137 136 - 145 mmol/L    Potassium 4.3 3.5 - 5.3 mmol/L    Chloride 100 98 - 107 mmol/L    Bicarbonate 23 21 - 32 mmol/L    Anion Gap 18 10 - 20 mmol/L    Urea Nitrogen 17 6 - 23 mg/dL    Creatinine 0.84 0.50 - 1.30 mg/dL    eGFR 90 >60 mL/min/1.73m*2    Calcium 9.2 8.6 - 10.3 mg/dL    Albumin 4.2 3.4 - 5.0 g/dL    Alkaline Phosphatase 64 33 - 136 U/L    Total Protein 7.0 6.4 - 8.2 g/dL    AST 43 (H) 9 - 39 U/L    Bilirubin, Total 0.7 0.0 - 1.2 mg/dL    ALT 41 10 - 52 U/L   Magnesium   Result Value Ref Range    Magnesium 2.06 1.60 - 2.40 mg/dL   B-Type Natriuretic Peptide   Result Value Ref Range    BNP 13 0 - 99 pg/mL   Troponin I, High Sensitivity, Initial   Result Value Ref Range    Troponin I, High Sensitivity 3 0 - 20 ng/L   Troponin, High Sensitivity, 1 Hour   Result Value Ref Range    Troponin I, High Sensitivity 3 0 - 20 ng/L   Urinalysis with Reflex Culture and Microscopic   Result Value Ref Range    Color, Urine Light-Yellow Light-Yellow, Yellow, Dark-Yellow    Appearance, Urine Clear Clear    Specific Gravity, Urine 1.032 1.005 - 1.035    pH, Urine 5.5 5.0, 5.5, 6.0, 6.5, 7.0, 7.5, 8.0    Protein, Urine NEGATIVE NEGATIVE, 10 (TRACE), 20 (TRACE) mg/dL    Glucose, Urine OVER (4+) (A) Normal mg/dL    Blood, Urine NEGATIVE NEGATIVE    Ketones, Urine TRACE (A) NEGATIVE mg/dL    Bilirubin, Urine NEGATIVE NEGATIVE    Urobilinogen, Urine Normal Normal mg/dL    Nitrite, Urine NEGATIVE NEGATIVE    Leukocyte Esterase, Urine NEGATIVE NEGATIVE   Extra Urine Gray Tube   Result Value Ref Range    Extra Tube Hold for add-ons.    POCT GLUCOSE   Result Value Ref Range    POCT Glucose 118 (H) 74 - 99 mg/dL   Lipid Panel   Result Value Ref Range    Cholesterol 178 0 - 199 mg/dL    HDL-Cholesterol 37.5 mg/dL    Cholesterol/HDL Ratio 4.7     LDL Calculated      VLDL      Triglycerides 516 (H) 0 - 149 mg/dL    Non HDL Cholesterol 141 0 - 149 mg/dL   Basic  Metabolic Panel   Result Value Ref Range    Glucose 127 (H) 74 - 99 mg/dL    Sodium 138 136 - 145 mmol/L    Potassium 3.8 3.5 - 5.3 mmol/L    Chloride 101 98 - 107 mmol/L    Bicarbonate 27 21 - 32 mmol/L    Anion Gap 14 10 - 20 mmol/L    Urea Nitrogen 16 6 - 23 mg/dL    Creatinine 0.74 0.50 - 1.30 mg/dL    eGFR >90 >60 mL/min/1.73m*2    Calcium 8.4 (L) 8.6 - 10.3 mg/dL   POCT GLUCOSE   Result Value Ref Range    POCT Glucose 129 (H) 74 - 99 mg/dL   POCT GLUCOSE   Result Value Ref Range    POCT Glucose 217 (H) 74 - 99 mg/dL       CT head wo IV contrast   Final Result   No acute intracranial abnormality.        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                  MACRO:   None        Signed by: Walter Collier 7/16/2024 6:49 PM   Dictation workstation:   [id number]      CT cervical spine wo IV contrast   Final Result   No acute intracranial abnormality.        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                  MACRO:   None        Signed by: Walter Collier 7/16/2024 6:49 PM   Dictation workstation:   [id number]      XR wrist right 3+ views   Final Result   Multifocal osteoarthritis, severe at the 1st CMC joint             MACRO:   None        Signed by: Bryant Sanchez 7/16/2024 5:01 PM   Dictation workstation:   [id number]      XR knee right 4+ views   Final Result   Early degenerative change without acute abnormality   Metallic foreign body in the anterior soft tissues of the knee        MACRO:   None        Signed by: Bryant Sanchez 7/16/2024 5:03 PM   Dictation workstation:   [id number]      XR chest 1 view   Final Result   1.  No evidence of acute cardiopulmonary process.                  MACRO:   None        Signed by: Bryant Sanchez 7/16/2024 5:02 PM   Dictation workstation:   [id number]             Assessment/Plan   Echocardiogram from July 2, 2024, LVEF 65%, moderate AAS, bicuspid aortic valve, mild to moderate aortic insufficiency, trace to mild MR, mild  TR, moderate LVH and abnormal LV relaxation    Carotid US July 2, 2024: LICA <20%, ALLEN 20-49%    Dizziness, syncope  -CT head negative  -Orthostatics negative but BP borderline low  -I reviewed the EKG, no acute changes, ST elevation, or depression  -Carotid and echo as above  -UA negative  -Telemetry reviewed, no arrhythmias noted  -IVF given  -Neuro consulted, possible polypharmacy  -Outpt ambulatory monitor to assess for arrhythmias    2. Hx of HTN now with borderline BP  -Stop amlodipine  -Orthostatics negative  -2gm na diet  -Monitor    3. Hyperlipidemia  -Cont statin    4. Diabetes Mellitus  -ISS  -Jardiance  -Accuchecks  -NANCY Bennett-CNP

## 2024-07-18 ENCOUNTER — PATIENT OUTREACH (OUTPATIENT)
Dept: PRIMARY CARE | Facility: CLINIC | Age: 79
End: 2024-07-18
Payer: MEDICARE

## 2024-07-18 ENCOUNTER — APPOINTMENT (OUTPATIENT)
Dept: CARDIOLOGY | Facility: HOSPITAL | Age: 79
End: 2024-07-18
Payer: MEDICARE

## 2024-07-18 LAB
EST. AVERAGE GLUCOSE BLD GHB EST-MCNC: 169 MG/DL
HBA1C MFR BLD: 7.5 %

## 2024-07-19 ENCOUNTER — PATIENT OUTREACH (OUTPATIENT)
Dept: PRIMARY CARE | Facility: CLINIC | Age: 79
End: 2024-07-19
Payer: MEDICARE

## 2024-07-19 NOTE — PROGRESS NOTES
Pt declining TCM services at this time. Pt wishing to make his own PCP follow up appt. Pt has no questions regarding medications changes or discharge instructions at this time. Pt states he is doing well since discharge.

## 2024-07-25 ENCOUNTER — TELEPHONE (OUTPATIENT)
Dept: PRIMARY CARE | Facility: CLINIC | Age: 79
End: 2024-07-25
Payer: MEDICARE

## 2024-07-25 DIAGNOSIS — Z79.4 TYPE 2 DIABETES MELLITUS WITH OTHER CIRCULATORY COMPLICATION, WITH LONG-TERM CURRENT USE OF INSULIN (MULTI): ICD-10-CM

## 2024-07-25 DIAGNOSIS — E78.5 HYPERLIPIDEMIA, UNSPECIFIED HYPERLIPIDEMIA TYPE: ICD-10-CM

## 2024-07-25 DIAGNOSIS — Z79.4 TYPE 2 DIABETES MELLITUS WITHOUT COMPLICATION, WITH LONG-TERM CURRENT USE OF INSULIN (MULTI): ICD-10-CM

## 2024-07-25 DIAGNOSIS — E11.9 TYPE 2 DIABETES MELLITUS WITHOUT COMPLICATION, WITH LONG-TERM CURRENT USE OF INSULIN (MULTI): ICD-10-CM

## 2024-07-25 DIAGNOSIS — E11.59 TYPE 2 DIABETES MELLITUS WITH OTHER CIRCULATORY COMPLICATION, WITH LONG-TERM CURRENT USE OF INSULIN (MULTI): ICD-10-CM

## 2024-07-25 RX ORDER — INSULIN GLARGINE 100 [IU]/ML
INJECTION, SOLUTION SUBCUTANEOUS
Qty: 3 EACH | Refills: 0 | Status: SHIPPED | OUTPATIENT
Start: 2024-07-25

## 2024-07-25 RX ORDER — ICOSAPENT ETHYL 1 G/1
CAPSULE ORAL
Qty: 360 CAPSULE | Refills: 0 | Status: SHIPPED | OUTPATIENT
Start: 2024-07-25

## 2024-07-25 RX ORDER — METFORMIN HYDROCHLORIDE 1000 MG/1
1000 TABLET ORAL
Qty: 180 TABLET | Refills: 0 | Status: SHIPPED | OUTPATIENT
Start: 2024-07-25

## 2024-07-25 RX ORDER — FLASH GLUCOSE SENSOR
2 KIT MISCELLANEOUS DAILY
Qty: 1 EACH | Refills: 2 | Status: SHIPPED | OUTPATIENT
Start: 2024-07-25

## 2024-07-25 NOTE — TELEPHONE ENCOUNTER
Pt calling for refills    NOV 7/29/2024  LOV 6/14/24    Insulin Pen  Use as directed  90 days    Freestyle joe sensors  Use as directed  90 days    Metformin 1000mg  1 PO BID  90 days    Icosapent 1gm  1 PO BID   90 days    BK Lr

## 2024-07-26 ENCOUNTER — HOSPITAL ENCOUNTER (OUTPATIENT)
Dept: RADIOLOGY | Facility: HOSPITAL | Age: 79
Discharge: HOME | End: 2024-07-26
Payer: MEDICARE

## 2024-07-26 ENCOUNTER — APPOINTMENT (OUTPATIENT)
Dept: GASTROENTEROLOGY | Facility: HOSPITAL | Age: 79
End: 2024-07-26
Payer: MEDICARE

## 2024-07-26 ENCOUNTER — HOSPITAL ENCOUNTER (OUTPATIENT)
Dept: GASTROENTEROLOGY | Facility: HOSPITAL | Age: 79
Discharge: HOME | End: 2024-07-26
Payer: MEDICARE

## 2024-07-26 VITALS
OXYGEN SATURATION: 97 % | HEART RATE: 80 BPM | DIASTOLIC BLOOD PRESSURE: 80 MMHG | SYSTOLIC BLOOD PRESSURE: 109 MMHG | RESPIRATION RATE: 16 BRPM | TEMPERATURE: 96.8 F

## 2024-07-26 DIAGNOSIS — M48.062 SPINAL STENOSIS, LUMBAR REGION, WITH NEUROGENIC CLAUDICATION: ICD-10-CM

## 2024-07-26 DIAGNOSIS — C64.9 MALIGNANT NEOPLASM OF UNSPECIFIED KIDNEY, EXCEPT RENAL PELVIS (MULTI): ICD-10-CM

## 2024-07-26 DIAGNOSIS — C85.97: ICD-10-CM

## 2024-07-26 DIAGNOSIS — C83.30 DIFFUSE LARGE B-CELL LYMPHOMA, UNSPECIFIED SITE (MULTI): ICD-10-CM

## 2024-07-26 LAB — GLUCOSE BLD MANUAL STRIP-MCNC: 250 MG/DL (ref 74–99)

## 2024-07-26 PROCEDURE — 82947 ASSAY GLUCOSE BLOOD QUANT: CPT

## 2024-07-26 PROCEDURE — 2550000001 HC RX 255 CONTRASTS: Performed by: ANESTHESIOLOGY

## 2024-07-26 PROCEDURE — 2500000005 HC RX 250 GENERAL PHARMACY W/O HCPCS: Performed by: ANESTHESIOLOGY

## 2024-07-26 PROCEDURE — 74177 CT ABD & PELVIS W/CONTRAST: CPT

## 2024-07-26 PROCEDURE — 2550000001 HC RX 255 CONTRASTS

## 2024-07-26 PROCEDURE — 62323 NJX INTERLAMINAR LMBR/SAC: CPT | Performed by: ANESTHESIOLOGY

## 2024-07-26 PROCEDURE — 2500000004 HC RX 250 GENERAL PHARMACY W/ HCPCS (ALT 636 FOR OP/ED): Performed by: ANESTHESIOLOGY

## 2024-07-26 RX ORDER — LIDOCAINE HYDROCHLORIDE 20 MG/ML
INJECTION, SOLUTION EPIDURAL; INFILTRATION; INTRACAUDAL; PERINEURAL AS NEEDED
Status: COMPLETED | OUTPATIENT
Start: 2024-07-26 | End: 2024-07-26

## 2024-07-26 RX ORDER — TRIAMCINOLONE ACETONIDE 40 MG/ML
INJECTION, SUSPENSION INTRA-ARTICULAR; INTRAMUSCULAR AS NEEDED
Status: COMPLETED | OUTPATIENT
Start: 2024-07-26 | End: 2024-07-26

## 2024-07-26 RX ORDER — LIDOCAINE HYDROCHLORIDE 5 MG/ML
INJECTION, SOLUTION INFILTRATION; INTRAVENOUS AS NEEDED
Status: COMPLETED | OUTPATIENT
Start: 2024-07-26 | End: 2024-07-26

## 2024-07-26 ASSESSMENT — PAIN SCALES - GENERAL: PAINLEVEL_OUTOF10: 0 - NO PAIN

## 2024-07-26 ASSESSMENT — PAIN - FUNCTIONAL ASSESSMENT
PAIN_FUNCTIONAL_ASSESSMENT: 0-10
PAIN_FUNCTIONAL_ASSESSMENT: 0-10

## 2024-07-26 NOTE — DISCHARGE INSTRUCTIONS
DISCHARGE INSTRUCTIONS FOR INJECTIONS     You underwent Caudal AYLEEN today    Aftermost injections, it is recommended that you relax and limit your activity for the remainder of the day unless you have been told otherwise by your pain physician.  You should not drive a car, operate machinery, or make important legal decisions unless otherwise directed by your pain physician.  You may resume your normal activity, including exercise, tomorrow.      Keep a written pain diary of how much pain relief you experienced following the injection procedure and the length of time of pain relief you experienced pain relief. Following diagnostic injections like medial branch nerve blocks, sacroiliac joint blocks, stellate ganglion injections and other blocks, it is very important you record the specific amount of pain relief you experienced immediately after the injectionand how long it lasted. Your doctor will ask you for this information at your follow up visit.     For all injections, please keep the injection site dry and inspect the site for a couple of days. You may remove the Band-Aid the day of the injection at any time.     Some discomfort, bruising or slight swelling may occur at the injection site. This is not abnormal if it occurs.  If needed you may:    -Take over the counter medication such as Tylenol or Motrin.   -Apply an ice pack for 30 minutes, 2 to 3 times a day for the first 24 hours.     You may shower today; no soaking baths, hot tubs, whirlpools or swimming pools for two days.      If you are given steroids in your injection, it may take 3-5 days for the steroid medication to take effect. You may notice a worsening of your symptoms for 1-2 days after the injection. This is not abnormal.  You may use acetaminophen, ibuprofen, or prescription medication that your doctor may have prescribed for you if you need to do so.     A few common side effects of steroids include facial flushing, sweating, restlessness,  irritability,difficulty sleeping, increase in blood sugar, and increased blood pressure. If you have diabetes, please monitor your blood sugar at least once a day for at least 5 days. If you have poorly controlled high blood pressure, monitoryour blood pressure for at least 2 days and contact your primary care physician if these numbers are unusually high for you.      If you take aspirin or non-steroidal anti-inflammatory drugs (examples are Motrin, Advil, ibuprofen, Naprosyn, Voltaren, Relafen, etc.) you may restart these this evening, but stop taking it 3 days before your next appointment, unless instructed otherwiseby your physician.      You do not need to discontinue non-aspirin-containing pain medications prior to an injection (examples: Celebrex, tramadol, hydrocodone and acetaminophen).      If you take a blood thinning medication (Coumadin, Lovenox, Fragmin,Ticlid, Plavix, Pradaxa, etc.), please discuss this with your primary care physician/cardiologist and your pain physician. These medications MUST be discontinued before you can have an injection safely, without the risk of uncontrolled bleeding. If these medications are not discontinued for an appropriate period of time, you will not be able to receivean injection.      If you are taking Coumadin, please have your INR checked the morning of your procedure and bringthe result to your appointment unless otherwise instructed. If your INR is over 1.2, your injection may need to be rescheduled to avoid uncontrolled bleeding from the needle placement.     Call [facility] at [phone] between 8am-4pm Monday - Friday if you are experiencing the following:    If you received an epidural or spinal injection:    -Headache that doesnot go away with medicine, is worse when sitting or standing up, and is greatly relieved upon lying down.   -Severe pain worse than or different than your baseline pain.   -Chills or fever (101º F or greater).    -Drainage or signs of infection at the injection site     Go directly to the Emergency Department if you are experiencing the following and received an epidural or spinal injection:   -Abrupt weakness or progressive weakness in your legs that starts after you leave the clinic.   -Abrupt severe or worsening numbness in your legs.   -Inability to urinate after the injection or loss of bowel or bladder control without the urge to defecate or urinate.     If you have a clinical question that cannot wait until your next appointment, please call [phone] between 8am-4pm Monday - Friday or send a [employer] message. We do our best to return all non-emergency messages within 24 hours, Monday - Friday. A nurse or physician will return your message.      If you need to cancel an appointment, please call the scheduling staff at [phone] during normal business hours or leave a message at least 24 hours in advance.     If you are going to be sedated for your next procedure, you MUST have responsible adult who can legally drive accompany you home. You cannot eat or drink for eight hours prior to the planned procedure if you are going to receive sedation. You may take your non-blood thinning medications with a small sip of water.      DISPLAY PLAN FREE TEXT

## 2024-07-29 ENCOUNTER — APPOINTMENT (OUTPATIENT)
Dept: PRIMARY CARE | Facility: CLINIC | Age: 79
End: 2024-07-29
Payer: MEDICARE

## 2024-07-29 VITALS
SYSTOLIC BLOOD PRESSURE: 102 MMHG | BODY MASS INDEX: 33.02 KG/M2 | WEIGHT: 210.4 LBS | HEART RATE: 82 BPM | HEIGHT: 67 IN | DIASTOLIC BLOOD PRESSURE: 68 MMHG | OXYGEN SATURATION: 94 %

## 2024-07-29 DIAGNOSIS — R42 DIZZINESS: Primary | ICD-10-CM

## 2024-07-29 DIAGNOSIS — T45.1X5A PERIPHERAL NEUROPATHY DUE TO CHEMOTHERAPY (MULTI): ICD-10-CM

## 2024-07-29 DIAGNOSIS — C83.30 DIFFUSE LARGE B-CELL LYMPHOMA, UNSPECIFIED BODY REGION (MULTI): ICD-10-CM

## 2024-07-29 DIAGNOSIS — R55 SYNCOPE, UNSPECIFIED SYNCOPE TYPE: ICD-10-CM

## 2024-07-29 DIAGNOSIS — G62.0 PERIPHERAL NEUROPATHY DUE TO CHEMOTHERAPY (MULTI): ICD-10-CM

## 2024-07-29 DIAGNOSIS — H93.13 TINNITUS OF BOTH EARS: ICD-10-CM

## 2024-07-29 PROCEDURE — 1159F MED LIST DOCD IN RCRD: CPT | Performed by: NURSE PRACTITIONER

## 2024-07-29 PROCEDURE — 3078F DIAST BP <80 MM HG: CPT | Performed by: NURSE PRACTITIONER

## 2024-07-29 PROCEDURE — 3074F SYST BP LT 130 MM HG: CPT | Performed by: NURSE PRACTITIONER

## 2024-07-29 PROCEDURE — 99496 TRANSJ CARE MGMT HIGH F2F 7D: CPT | Performed by: NURSE PRACTITIONER

## 2024-07-29 PROCEDURE — 1157F ADVNC CARE PLAN IN RCRD: CPT | Performed by: NURSE PRACTITIONER

## 2024-07-29 RX ORDER — MECLIZINE HCL 12.5 MG 12.5 MG/1
12.5 TABLET ORAL 3 TIMES DAILY PRN
Qty: 30 TABLET | Refills: 0 | Status: SHIPPED | OUTPATIENT
Start: 2024-07-29 | End: 2024-08-28

## 2024-07-29 RX ORDER — MECLIZINE HCL 12.5 MG 12.5 MG/1
12.5 TABLET ORAL 3 TIMES DAILY PRN
Qty: 30 TABLET | Refills: 0 | Status: SHIPPED | OUTPATIENT
Start: 2024-07-29 | End: 2024-07-29

## 2024-07-29 ASSESSMENT — ENCOUNTER SYMPTOMS
CONSTIPATION: 0
FATIGUE: 0
SLEEP DISTURBANCE: 1
WHEEZING: 0
CHILLS: 0
HEADACHES: 0
DIZZINESS: 0
DIARRHEA: 0
WEAKNESS: 0
NUMBNESS: 1
ABDOMINAL PAIN: 0
MYALGIAS: 0
COLOR CHANGE: 0
ABDOMINAL DISTENTION: 0
BRUISES/BLEEDS EASILY: 0
EYE PAIN: 0
COUGH: 0
FEVER: 0
PALPITATIONS: 0
SHORTNESS OF BREATH: 0
SEIZURES: 0
JOINT SWELLING: 0
DIFFICULTY URINATING: 0
NAUSEA: 0
ADENOPATHY: 0
WOUND: 0
TROUBLE SWALLOWING: 0

## 2024-07-29 NOTE — PROGRESS NOTES
Subjective   Patient ID: Jossue Tam is a 79 y.o. male who presents for Follow-up (TCM/Admission 7/16/24/Discharge 7/17/24/Pt is still experiencing dizziness, ruled out stroke in ER, ER wasn't sure if his Lyrica dose is not correct.  Dr in ER reduced the dosage in the hospital back down to 225, has made a little difference.  Pt's wife believes he should be using a walker/cane.   ).    Patient seen today for hospital follow-up. Patient seen sitting up in room, in no acute distress, with spouse present for assessment.  He is alert, oriented and appears in fair spirits.  He was hospitalized last week for evaluation of syncope/dizziness.  Patient states that this occurred while he was leaving the dentist and someone saw him stumbling/fall into a bush.  Patient was evaluated and had multiple test completed before he was discharged home.  His amlodipine was stopped and his Lyrica was decreased.  Patient has already had follow-up with Dr. Pleitez from cardiology where he underwent Holter monitor testing.  Patient denies any additional episodes of syncope since returning back home but admits to persistent dizziness.  He states that this has been going on for the past 2 years.  He has seen a neurologist in the past and is agreeable to follow-up with 1 once again for additional evaluation and recommendation purposes.  No reported issues with appetite, staying hydrated, bowel or bladder.  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  The dose adjustment and Lyrica appear to help with his pain but then also causes some additional grogginess.  He follows with routine management and has been discussing additional procedures/treatments for pain control.  Patient continues with Norco routinely for chronic back pain.  Patient follows routinely with oncology for non-Hodgkin's lymphoma.  He appears to be doing well after stage IV cancer diagnosis.  He states he has  "follow-up with oncology at the end of the week to discuss recent PET scan results.    Patient also reports recent concerns for hearing \"crickets \"in his ears.  He denies known history of tinnitus but is agreeable for ENT follow-up.  He denies any ear pain, discharge other sinus or allergy complaints.  Medications reviewed.  No other acute concerns voiced at this time.             Current Outpatient Medications on File Prior to Visit   Medication Sig Dispense Refill    acyclovir (Zovirax) 400 mg tablet Take 1 tablet (400 mg) by mouth 2 times a day. 180 tablet 0    allopurinol (Zyloprim) 100 mg tablet Take 1 tablet (100 mg) by mouth once daily. 90 tablet 1    amitriptyline (Elavil) 100 mg tablet Take 0.5 tablets (50 mg) by mouth once daily at bedtime. 45 tablet 1    aspirin 81 mg EC tablet Take 1 tablet (81 mg) by mouth once daily.      cholecalciferol (Vitamin D-3) 50 MCG (2000 UT) tablet Take 1 tablet (2,000 Units) by mouth once daily.      cyanocobalamin, vitamin B-12, 2,500 mcg tablet, sublingual TAKE AS DIRECTED.      donepezil (Aricept) 10 mg tablet Take 1 tablet (10 mg) by mouth once daily at bedtime.      DULoxetine (Cymbalta) 60 mg DR capsule Take 1 capsule (60 mg) by mouth once daily. 90 capsule 1    empagliflozin (Jardiance) 25 mg Take 1 tablet (25 mg) by mouth once daily. 90 tablet 1    flash glucose sensor kit (FreeStyle Yo 14 Day Sensor) kit Inject 2 each under the skin once daily. Use as instructed 1 each 2    ibuprofen 800 mg tablet Take 1 tablet (800 mg) by mouth 3 times a day as needed for moderate pain (4 - 6). 90 tablet 0    icosapent ethyL (Vascepa) 1 gram capsule TAKE 4 CAPSULES BY MOUTH EVERY DAY AS DIRECTED 360 capsule 0    insulin glargine (Lantus) 100 unit/mL (3 mL) pen inject 25 units under the skin once daily at bedtime 3 each 0    lidocaine (Lidoderm) 5 % patch see administration instructions. Apply as directed by physician      metFORMIN (Glucophage) 1,000 mg tablet Take 1 tablet " (1,000 mg) by mouth 2 times daily (morning and late afternoon). 180 tablet 0    metoclopramide (Reglan) 10 mg tablet Take 0.5 tablets (5 mg) by mouth 3 times a day. 270 tablet 1    multivit-iron-minerals-folic acid (Centrum Silver) 0.4 mg-300 mcg- 250 mcg tab Take 1 tablet by mouth once daily.      pregabalin (Lyrica) 225 mg capsule Take 1 capsule (225 mg) by mouth 2 times a day. 60 capsule 0    rOPINIRole (Requip) 0.25 mg tablet Take 1 tablet (0.25 mg) by mouth once daily at bedtime. 30 tablet 0    simvastatin (Zocor) 10 mg tablet Take 1 tablet (10 mg) by mouth once daily. 90 tablet 2    tamsulosin (Flomax) 0.4 mg 24 hr capsule Take 2 capsules (0.8 mg) by mouth once daily. 90 capsule 2    dulaglutide (Trulicity) 3 mg/0.5 mL pen injector Inject 3 mg under the skin 1 (one) time per week. 3 mL 1    [] naloxone (Narcan) 4 mg/0.1 mL nasal spray Administer 1 spray (4 mg) into affected nostril(s) if needed for opioid reversal. May repeat every 2-3 minutes if needed, alternating nostrils, until medical assistance becomes available. 2 each 0    pantoprazole (ProtoNix) 40 mg EC tablet 1 pill daily (Patient not taking: Reported on 2024) 90 tablet 1    triamcinolone (Kenalog) 0.1 % cream Apply topically 2 times a day. Apply to affected area 1-2 times daily as needed. (Patient not taking: Reported on 2024) 15 g 0    [DISCONTINUED] FreeStyle Yo sensor system (FreeStyle Yo 14 Day Sensor) kit Inject 2 each under the skin once daily. Use as instructed 1 each 2    [DISCONTINUED] icosapent ethyL (Vascepa) 1 gram capsule TAKE 4 CAPSULES BY MOUTH EVERY DAY AS DIRECTED 360 capsule 1    [DISCONTINUED] insulin glargine (Lantus) 100 unit/mL (3 mL) pen inject 25 units under the skin once daily at bedtime 3 each 2    [DISCONTINUED] metFORMIN (Glucophage) 1,000 mg tablet Take 1 tablet (1,000 mg) by mouth 2 times a day with meals. 180 tablet 2     No current facility-administered medications on file prior to visit.  "      Past Medical History:   Diagnosis Date    Back abscess 05/30/2023    Hallucinations, visual 05/30/2023        Past Surgical History:   Procedure Laterality Date    OTHER SURGICAL HISTORY  06/24/2021    Ankle surgery        No family history on file.     Review of Systems   Constitutional:  Negative for chills, fatigue and fever.   HENT:  Positive for hearing loss. Negative for dental problem and trouble swallowing.    Eyes:  Negative for pain and visual disturbance.        Wears glasses for reading   Respiratory:  Negative for cough, shortness of breath and wheezing.    Cardiovascular:  Negative for chest pain, palpitations and leg swelling.   Gastrointestinal:  Negative for abdominal distention, abdominal pain, constipation, diarrhea and nausea.   Endocrine: Negative for cold intolerance and heat intolerance.   Genitourinary:  Negative for difficulty urinating.   Musculoskeletal:  Negative for gait problem, joint swelling and myalgias.   Skin:  Negative for color change, pallor, rash and wound.   Allergic/Immunologic: Negative for environmental allergies and food allergies.   Neurological:  Positive for numbness. Negative for dizziness, seizures, weakness and headaches.        Neuropathy in hands secondary to chemo; neuropathy in feet secondary to diabetes   Hematological:  Negative for adenopathy. Does not bruise/bleed easily.   Psychiatric/Behavioral:  Positive for sleep disturbance. Negative for behavioral problems.         Positive for poor quality sleep   All other systems reviewed and are negative.      Objective   /68   Pulse 82   Ht 1.702 m (5' 7\")   Wt 95.4 kg (210 lb 6.4 oz)   SpO2 94%   BMI 32.95 kg/m²     Physical Exam  Constitutional:       General: He is not in acute distress.     Appearance: Normal appearance. He is not toxic-appearing.   HENT:      Head: Normocephalic and atraumatic.      Right Ear: Tympanic membrane, ear canal and external ear normal.      Left Ear: Tympanic " membrane, ear canal and external ear normal.      Nose: Nose normal.      Mouth/Throat:      Mouth: Mucous membranes are moist.      Pharynx: Oropharynx is clear.   Eyes:      Extraocular Movements: Extraocular movements intact.      Conjunctiva/sclera: Conjunctivae normal.      Pupils: Pupils are equal, round, and reactive to light.   Cardiovascular:      Rate and Rhythm: Normal rate and regular rhythm.      Pulses: Normal pulses.      Heart sounds: Murmur heard.   Pulmonary:      Effort: Pulmonary effort is normal.      Breath sounds: Normal breath sounds. No wheezing.   Abdominal:      General: Bowel sounds are normal.      Palpations: Abdomen is soft.   Musculoskeletal:         General: No swelling.      Cervical back: Normal range of motion and neck supple.   Skin:     General: Skin is warm and dry.   Neurological:      General: No focal deficit present.      Mental Status: He is alert and oriented to person, place, and time. Mental status is at baseline.      Cranial Nerves: No cranial nerve deficit.      Motor: No weakness.   Psychiatric:         Mood and Affect: Mood normal.         Behavior: Behavior normal.         Thought Content: Thought content normal.         Judgment: Judgment normal.         Assessment/Plan   Problem List Items Addressed This Visit             ICD-10-CM    Dizziness - Primary R42     Will trial as needed meclizine to see if this helps with patient's symptoms.  Patient instructed to follow-up with neurologist for additional evaluation and recommendations.         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  He is to maintain routine follow-up with pain management for additional treatment recommendations.         Diffuse large B-cell lymphoma, unspecified body region (Multi) C83.30     Patient to maintain routine follow-up with oncology for continued  evaluation and treatment recommendations.         Tinnitus of both ears H93.13     Referral for ENT placed at this time with patient's consent         Relevant Orders    Referral to ENT    Syncope R55     Patient to follow-up with cardiology to discuss Holter monitor results.  Provider to be notified of any recurrent issues with syncope.  Patient to monitor his blood pressures routinely, increase hydration and keep adequate control of blood glucose levels

## 2024-07-30 PROBLEM — H93.13 TINNITUS OF BOTH EARS: Status: ACTIVE | Noted: 2024-07-30

## 2024-07-30 PROBLEM — R55 SYNCOPE: Status: ACTIVE | Noted: 2024-07-30

## 2024-07-30 NOTE — ASSESSMENT & PLAN NOTE
Patient to follow-up with cardiology to discuss Holter monitor results.  Provider to be notified of any recurrent issues with syncope.  Patient to monitor his blood pressures routinely, increase hydration and keep adequate control of blood glucose levels

## 2024-07-30 NOTE — ASSESSMENT & PLAN NOTE
Patient did not tolerate increase Lyrica dosing.  He is to maintain routine follow-up with pain management for additional treatment recommendations.

## 2024-07-30 NOTE — ASSESSMENT & PLAN NOTE
Will trial as needed meclizine to see if this helps with patient's symptoms.  Patient instructed to follow-up with neurologist for additional evaluation and recommendations.

## 2024-08-01 ENCOUNTER — TELEPHONE (OUTPATIENT)
Dept: PRIMARY CARE | Facility: CLINIC | Age: 79
End: 2024-08-01
Payer: MEDICARE

## 2024-08-01 DIAGNOSIS — N40.0 BENIGN PROSTATIC HYPERPLASIA WITHOUT LOWER URINARY TRACT SYMPTOMS: ICD-10-CM

## 2024-08-01 DIAGNOSIS — Z79.4 TYPE 2 DIABETES MELLITUS WITHOUT COMPLICATION, WITH LONG-TERM CURRENT USE OF INSULIN (MULTI): ICD-10-CM

## 2024-08-01 DIAGNOSIS — E11.9 TYPE 2 DIABETES MELLITUS WITHOUT COMPLICATION, WITH LONG-TERM CURRENT USE OF INSULIN (MULTI): ICD-10-CM

## 2024-08-01 RX ORDER — TAMSULOSIN HYDROCHLORIDE 0.4 MG/1
0.8 CAPSULE ORAL DAILY
Qty: 180 CAPSULE | Refills: 2 | Status: SHIPPED | OUTPATIENT
Start: 2024-08-01

## 2024-08-01 RX ORDER — FLASH GLUCOSE SENSOR
1 KIT MISCELLANEOUS
Qty: 6 EACH | Refills: 2 | Status: SHIPPED | OUTPATIENT
Start: 2024-08-01

## 2024-08-01 NOTE — TELEPHONE ENCOUNTER
Pt calling upset b/c he went to Pharm to  his meds and some are incorrect. He usually gets 6 sensors for his Freestyle Yo. Also he takes 2 Tamsulosin a day and they only gave him 90 pills it needs to be for 90 days so 180. He would like new scripts for these to be sent in.    BK Lr

## 2024-08-02 ENCOUNTER — APPOINTMENT (OUTPATIENT)
Dept: HEMATOLOGY/ONCOLOGY | Facility: CLINIC | Age: 79
End: 2024-08-02
Payer: MEDICARE

## 2024-08-02 ENCOUNTER — TELEPHONE (OUTPATIENT)
Dept: HEMATOLOGY/ONCOLOGY | Facility: CLINIC | Age: 79
End: 2024-08-02
Payer: MEDICARE

## 2024-08-03 NOTE — DISCHARGE SUMMARY
Discharge Diagnosis  Dizziness    Issues Requiring Follow-Up  Dizziness    Discharge Meds     Your medication list        CHANGE how you take these medications        Instructions Last Dose Given Next Dose Due   metoclopramide 10 mg tablet  Commonly known as: Reglan  What changed: additional instructions      Take 0.5 tablets (5 mg) by mouth 3 times a day.       pregabalin 225 mg capsule  Commonly known as: Lyrica  What changed:   medication strength  how much to take      Take 1 capsule (225 mg) by mouth 2 times a day.       rOPINIRole 0.25 mg tablet  Commonly known as: Requip  What changed: when to take this      Take 1 tablet (0.25 mg) by mouth once daily at bedtime.              CONTINUE taking these medications        Instructions Last Dose Given Next Dose Due   acyclovir 400 mg tablet  Commonly known as: Zovirax      Take 1 tablet (400 mg) by mouth 2 times a day.       allopurinol 100 mg tablet  Commonly known as: Zyloprim      Take 1 tablet (100 mg) by mouth once daily.       amitriptyline 100 mg tablet  Commonly known as: Elavil      Take 0.5 tablets (50 mg) by mouth once daily at bedtime.       aspirin 81 mg EC tablet           Centrum Silver 0.4 mg-300 mcg- 250 mcg  Generic drug: multivitamin with minerals iron-free           cholecalciferol 50 MCG (2000 UT) tablet  Commonly known as: Vitamin D-3           cyanocobalamin (vitamin B-12) 2,500 mcg tablet, sublingual SL tablet           donepezil 10 mg tablet  Commonly known as: Aricept           DULoxetine 60 mg DR capsule  Commonly known as: Cymbalta      Take 1 capsule (60 mg) by mouth once daily.       empagliflozin 25 mg  Commonly known as: Jardiance      Take 1 tablet (25 mg) by mouth once daily.       FreeStyle Yo 14 Day Sensor kit  Generic drug: flash glucose sensor kit      Inject 2 each under the skin once daily. Use as instructed       ibuprofen 800 mg tablet      Take 1 tablet (800 mg) by mouth 3 times a day as needed for moderate pain (4 -  6).       icosapent ethyL 1 gram capsule  Commonly known as: Vascepa      TAKE 4 CAPSULES BY MOUTH EVERY DAY AS DIRECTED       insulin glargine 100 unit/mL (3 mL) pen  Commonly known as: Lantus      inject 25 units under the skin once daily at bedtime       lidocaine 5 % patch  Commonly known as: Lidoderm           metFORMIN 1,000 mg tablet  Commonly known as: Glucophage      Take 1 tablet (1,000 mg) by mouth 2 times a day with meals.       naloxone 4 mg/0.1 mL nasal spray  Commonly known as: Narcan      Administer 1 spray (4 mg) into affected nostril(s) if needed for opioid reversal. May repeat every 2-3 minutes if needed, alternating nostrils, until medical assistance becomes available.       pantoprazole 40 mg EC tablet  Commonly known as: ProtoNix      1 pill daily       simvastatin 10 mg tablet  Commonly known as: Zocor      Take 1 tablet (10 mg) by mouth once daily.       tamsulosin 0.4 mg 24 hr capsule  Commonly known as: Flomax      Take 2 capsules (0.8 mg) by mouth once daily.       triamcinolone 0.1 % cream  Commonly known as: Kenalog      Apply topically 2 times a day. Apply to affected area 1-2 times daily as needed.       Trulicity 3 mg/0.5 mL pen injector  Generic drug: dulaglutide      Inject 3 mg under the skin 1 (one) time per week.              STOP taking these medications      amLODIPine 5 mg tablet  Commonly known as: Norvasc        HYDROcodone-acetaminophen  mg tablet  Commonly known as: Norco        losartan 100 mg tablet  Commonly known as: Cozaar                  Where to Get Your Medications        These medications were sent to [id number] - [facility] [id number] [facility] [place] [address]      Phone: [phone]   pregabalin 225 mg capsule  rOPINIRole 0.25 mg tablet         Test Results Pending At Discharge  Pending Labs       No current pending labs.            Hospital Course   He is 79 years old man with past medical history of  insulin-dependent diabetes, hypertension, dementia, chemo-induced neuropathy, anxiety and depression, B-cell lymphoma in remission who was admitted to [facility] due to dizziness/lightheaded for the past 2 week.      # Lightheadedness   this can be due to polypharmacy versus neuropathy(due to diabetes or side effect chemotherapy) versus cardiac origin(arhythmia) vs orthostatic hypotension. Based on the symptoms and physical exam I do not think patient is having stroke/TIA.  -Orthostatic hypotension was ruled out during the hospitalization   -Recommended physical therapy for balance as outpatient  -Reduced amitriptyline 25 mg(from 50 mg). Also here was reduced Lyrica to 225 mg.   -Reglan added outpatient  -Patient will follow up with neurology outpatient    Pertinent Physical Exam At Time of Discharge  Physical Exam  Constitutional:       Appearance: Normal appearance. He is normal weight.   HENT:      Head: Normocephalic and atraumatic.      Nose: Nose normal.      Mouth/Throat:      Mouth: Mucous membranes are moist.      Pharynx: Oropharynx is clear.   Eyes:      Extraocular Movements: Extraocular movements intact.      Conjunctiva/sclera: Conjunctivae normal.      Pupils: Pupils are equal, round, and reactive to light.   Cardiovascular:      Rate and Rhythm: Normal rate and regular rhythm.      Pulses: Normal pulses.      Heart sounds: Normal heart sounds.   Pulmonary:      Effort: Pulmonary effort is normal.      Breath sounds: Normal breath sounds.   Abdominal:      General: Abdomen is flat. Bowel sounds are normal.      Palpations: Abdomen is soft.   Musculoskeletal:         General: Normal range of motion.   Skin:     General: Skin is warm and dry.   Neurological:      General: No focal deficit present.      Mental Status: He is alert.   Psychiatric:         Mood and Affect: Mood normal.         Behavior: Behavior normal.         Thought Content: Thought content normal.         Judgment: Judgment normal.          Outpatient Follow-Up  Future Appointments   Date Time Provider Department [place]   8/9/2024  2:15 PM Jersey Johnson MD [id number] [facility]   8/26/2024  1:30 PM Hong Burton PA-C [id number] [facility]   9/9/2024 11:20 AM NANCY Araujo-CNP [id number] [facility]   9/12/2024  2:45 PM Jersey Johnson MD [id number] [facility]         NANCY Arreguin-CNP

## 2024-08-09 ENCOUNTER — HOSPITAL ENCOUNTER (OUTPATIENT)
Dept: GASTROENTEROLOGY | Facility: HOSPITAL | Age: 79
Discharge: HOME | End: 2024-08-09
Payer: MEDICARE

## 2024-08-09 VITALS
WEIGHT: 210 LBS | HEART RATE: 74 BPM | TEMPERATURE: 96.8 F | BODY MASS INDEX: 32.96 KG/M2 | DIASTOLIC BLOOD PRESSURE: 73 MMHG | SYSTOLIC BLOOD PRESSURE: 106 MMHG | OXYGEN SATURATION: 96 % | HEIGHT: 67 IN | RESPIRATION RATE: 16 BRPM

## 2024-08-09 DIAGNOSIS — M46.1 SACROILIITIS (CMS-HCC): ICD-10-CM

## 2024-08-09 LAB — GLUCOSE BLD MANUAL STRIP-MCNC: 125 MG/DL (ref 74–99)

## 2024-08-09 PROCEDURE — 2550000001 HC RX 255 CONTRASTS: Performed by: ANESTHESIOLOGY

## 2024-08-09 PROCEDURE — 2500000004 HC RX 250 GENERAL PHARMACY W/ HCPCS (ALT 636 FOR OP/ED): Performed by: ANESTHESIOLOGY

## 2024-08-09 PROCEDURE — 2500000005 HC RX 250 GENERAL PHARMACY W/O HCPCS: Performed by: ANESTHESIOLOGY

## 2024-08-09 PROCEDURE — 27096 INJECT SACROILIAC JOINT: CPT | Performed by: ANESTHESIOLOGY

## 2024-08-09 PROCEDURE — 82947 ASSAY GLUCOSE BLOOD QUANT: CPT

## 2024-08-09 RX ORDER — LIDOCAINE HYDROCHLORIDE 20 MG/ML
INJECTION, SOLUTION EPIDURAL; INFILTRATION; INTRACAUDAL; PERINEURAL AS NEEDED
Status: COMPLETED | OUTPATIENT
Start: 2024-08-09 | End: 2024-08-09

## 2024-08-09 RX ORDER — BUPIVACAINE HYDROCHLORIDE 5 MG/ML
INJECTION, SOLUTION PERINEURAL AS NEEDED
Status: COMPLETED | OUTPATIENT
Start: 2024-08-09 | End: 2024-08-09

## 2024-08-09 RX ORDER — TRIAMCINOLONE ACETONIDE 40 MG/ML
INJECTION, SUSPENSION INTRA-ARTICULAR; INTRAMUSCULAR AS NEEDED
Status: COMPLETED | OUTPATIENT
Start: 2024-08-09 | End: 2024-08-09

## 2024-08-09 ASSESSMENT — PAIN SCALES - GENERAL
PAINLEVEL_OUTOF10: 7
PAINLEVEL_OUTOF10: 7

## 2024-08-09 ASSESSMENT — ENCOUNTER SYMPTOMS
OCCASIONAL FEELINGS OF UNSTEADINESS: 0
DEPRESSION: 0
LOSS OF SENSATION IN FEET: 0

## 2024-08-09 ASSESSMENT — PAIN - FUNCTIONAL ASSESSMENT
PAIN_FUNCTIONAL_ASSESSMENT: 0-10
PAIN_FUNCTIONAL_ASSESSMENT: 0-10

## 2024-08-09 ASSESSMENT — PAIN DESCRIPTION - DESCRIPTORS: DESCRIPTORS: SHARP

## 2024-08-09 NOTE — PERIOPERATIVE NURSING NOTE
Received patient from procedure room.   Patient alert and awake.   Band aid x 1 to right low back dry and intact.  Discharge instructions reviewed with patient.  Patient ambulates without difficulty, pt denies weakness of lower extremities

## 2024-08-09 NOTE — DISCHARGE INSTRUCTIONS
DISCHARGE INSTRUCTIONS FOR INJECTIONS     You underwent right sacroiliac joint injection today    Aftermost injections, it is recommended that you relax and limit your activity for the remainder of the day unless you have been told otherwise by your pain physician.  You should not drive a car, operate machinery, or make important legal decisions unless otherwise directed by your pain physician.  You may resume your normal activity, including exercise, tomorrow.      Keep a written pain diary of how much pain relief you experienced following the injection procedure and the length of time of pain relief you experienced pain relief. Following diagnostic injections like medial branch nerve blocks, sacroiliac joint blocks, stellate ganglion injections and other blocks, it is very important you record the specific amount of pain relief you experienced immediately after the injectionand how long it lasted. Your doctor will ask you for this information at your follow up visit.     For all injections, please keep the injection site dry and inspect the site for a couple of days. You may remove the Band-Aid the day of the injection at any time.     Some discomfort, bruising or slight swelling may occur at the injection site. This is not abnormal if it occurs.  If needed you may:    -Take over the counter medication such as Tylenol or Motrin.   -Apply an ice pack for 30 minutes, 2 to 3 times a day for the first 24 hours.     You may shower today; no soaking baths, hot tubs, whirlpools or swimming pools for two days.      If you are given steroids in your injection, it may take 3-5 days for the steroid medication to take effect. You may notice a worsening of your symptoms for 1-2 days after the injection. This is not abnormal.  You may use acetaminophen, ibuprofen, or prescription medication that your doctor may have prescribed for you if you need to do so.     A few common side effects of steroids include facial flushing,  sweating, restlessness, irritability,difficulty sleeping, increase in blood sugar, and increased blood pressure. If you have diabetes, please monitor your blood sugar at least once a day for at least 5 days. If you have poorly controlled high blood pressure, monitoryour blood pressure for at least 2 days and contact your primary care physician if these numbers are unusually high for you.      If you take aspirin or non-steroidal anti-inflammatory drugs (examples are Motrin, Advil, ibuprofen, Naprosyn, Voltaren, Relafen, etc.) you may restart these this evening, but stop taking it 3 days before your next appointment, unless instructed otherwiseby your physician.      You do not need to discontinue non-aspirin-containing pain medications prior to an injection (examples: Celebrex, tramadol, hydrocodone and acetaminophen).      If you take a blood thinning medication (Coumadin, Lovenox, Fragmin,Ticlid, Plavix, Pradaxa, etc.), please discuss this with your primary care physician/cardiologist and your pain physician. These medications MUST be discontinued before you can have an injection safely, without the risk of uncontrolled bleeding. If these medications are not discontinued for an appropriate period of time, you will not be able to receivean injection.      If you are taking Coumadin, please have your INR checked the morning of your procedure and bringthe result to your appointment unless otherwise instructed. If your INR is over 1.2, your injection may need to be rescheduled to avoid uncontrolled bleeding from the needle placement.     Call [facility] at [phone] between 8am-4pm Monday - Friday if you are experiencing the following:    If you received an epidural or spinal injection:    -Headache that doesnot go away with medicine, is worse when sitting or standing up, and is greatly relieved upon lying down.   -Severe pain worse than or different than your baseline pain.   -Chills or fever  (101º F or greater).   -Drainage or signs of infection at the injection site     Go directly to the Emergency Department if you are experiencing the following and received an epidural or spinal injection:   -Abrupt weakness or progressive weakness in your legs that starts after you leave the clinic.   -Abrupt severe or worsening numbness in your legs.   -Inability to urinate after the injection or loss of bowel or bladder control without the urge to defecate or urinate.     If you have a clinical question that cannot wait until your next appointment, please call [phone] between 8am-4pm Monday - Friday or send a [employer] message. We do our best to return all non-emergency messages within 24 hours, Monday - Friday. A nurse or physician will return your message.      If you need to cancel an appointment, please call the scheduling staff at [phone] during normal business hours or leave a message at least 24 hours in advance.     If you are going to be sedated for your next procedure, you MUST have responsible adult who can legally drive accompany you home. You cannot eat or drink for eight hours prior to the planned procedure if you are going to receive sedation. You may take your non-blood thinning medications with a small sip of water.

## 2024-08-13 ENCOUNTER — TELEPHONE (OUTPATIENT)
Dept: HEMATOLOGY/ONCOLOGY | Facility: CLINIC | Age: 79
End: 2024-08-13
Payer: MEDICARE

## 2024-08-15 DIAGNOSIS — M51.36 DDD (DEGENERATIVE DISC DISEASE), LUMBAR: Primary | ICD-10-CM

## 2024-08-15 RX ORDER — METHOCARBAMOL 750 MG/1
750 TABLET, FILM COATED ORAL 3 TIMES DAILY
COMMUNITY
End: 2024-08-15 | Stop reason: SDUPTHER

## 2024-08-15 RX ORDER — METHOCARBAMOL 750 MG/1
750 TABLET, FILM COATED ORAL 3 TIMES DAILY
Qty: 90 TABLET | Refills: 0 | Status: SHIPPED | OUTPATIENT
Start: 2024-08-15 | End: 2024-09-14

## 2024-08-19 ENCOUNTER — TELEPHONE (OUTPATIENT)
Dept: PRIMARY CARE | Facility: CLINIC | Age: 79
End: 2024-08-19
Payer: MEDICARE

## 2024-08-19 DIAGNOSIS — F33.0 MILD EPISODE OF RECURRENT MAJOR DEPRESSIVE DISORDER (CMS-HCC): ICD-10-CM

## 2024-08-19 DIAGNOSIS — Z79.4 TYPE 2 DIABETES MELLITUS WITHOUT COMPLICATION, WITH LONG-TERM CURRENT USE OF INSULIN (MULTI): ICD-10-CM

## 2024-08-19 DIAGNOSIS — E11.9 TYPE 2 DIABETES MELLITUS WITHOUT COMPLICATION, WITH LONG-TERM CURRENT USE OF INSULIN (MULTI): ICD-10-CM

## 2024-08-19 DIAGNOSIS — E78.5 HYPERLIPIDEMIA, UNSPECIFIED HYPERLIPIDEMIA TYPE: ICD-10-CM

## 2024-08-19 DIAGNOSIS — E11.9 TYPE 2 DIABETES MELLITUS WITHOUT COMPLICATION, WITHOUT LONG-TERM CURRENT USE OF INSULIN (MULTI): ICD-10-CM

## 2024-08-19 DIAGNOSIS — E11.59 TYPE 2 DIABETES MELLITUS WITH OTHER CIRCULATORY COMPLICATION, WITH LONG-TERM CURRENT USE OF INSULIN (MULTI): ICD-10-CM

## 2024-08-19 DIAGNOSIS — Z79.4 TYPE 2 DIABETES MELLITUS WITH OTHER CIRCULATORY COMPLICATION, WITH LONG-TERM CURRENT USE OF INSULIN (MULTI): ICD-10-CM

## 2024-08-19 DIAGNOSIS — M50.30 DDD (DEGENERATIVE DISC DISEASE), CERVICAL: ICD-10-CM

## 2024-08-19 DIAGNOSIS — M51.36 DDD (DEGENERATIVE DISC DISEASE), LUMBAR: ICD-10-CM

## 2024-08-19 RX ORDER — DULAGLUTIDE 3 MG/.5ML
3 INJECTION, SOLUTION SUBCUTANEOUS
Qty: 3 ML | Refills: 1 | Status: CANCELLED | OUTPATIENT
Start: 2024-08-25 | End: 2024-11-23

## 2024-08-19 NOTE — TELEPHONE ENCOUNTER
Rx Refill Request Telephone Encounter    Name:  Jossue Tam  :  [id number]  Medication Name:    empagliflozin (Jardiance) tablet 25 mg     simvastatin (Zocor) 10 mg tablet   DULoxetine (Cymbalta) DR capsule 60 mg   dulaglutide (Trulicity) 3 mg/0.5 mL pen injector   metoclopramide (Reglan) 10 mg tablet   empagliflozin (Jardiance) 25 mg pt is low  amLODIPine (Norvasc) 5 mg tablet     Requesting 90 day on all    Specific Pharmacy location:  [facility]  Date of last appointment:  2024  Date of next appointment:  2024  Best number to reach patient:  [phone]

## 2024-08-20 RX ORDER — SIMVASTATIN 10 MG/1
10 TABLET, FILM COATED ORAL DAILY
Qty: 90 TABLET | Refills: 2 | Status: SHIPPED | OUTPATIENT
Start: 2024-08-20

## 2024-08-20 RX ORDER — DULOXETIN HYDROCHLORIDE 60 MG/1
60 CAPSULE, DELAYED RELEASE ORAL DAILY
Qty: 90 CAPSULE | Refills: 1 | Status: SHIPPED | OUTPATIENT
Start: 2024-08-20

## 2024-08-20 RX ORDER — INSULIN GLARGINE 100 [IU]/ML
INJECTION, SOLUTION SUBCUTANEOUS
Qty: 3 EACH | Refills: 0 | Status: SHIPPED | OUTPATIENT
Start: 2024-08-20

## 2024-08-26 ENCOUNTER — OFFICE VISIT (OUTPATIENT)
Dept: PAIN MEDICINE | Facility: CLINIC | Age: 79
End: 2024-08-26
Payer: MEDICARE

## 2024-08-26 VITALS
HEIGHT: 67 IN | SYSTOLIC BLOOD PRESSURE: 128 MMHG | OXYGEN SATURATION: 98 % | DIASTOLIC BLOOD PRESSURE: 80 MMHG | WEIGHT: 210 LBS | BODY MASS INDEX: 32.96 KG/M2 | HEART RATE: 70 BPM | RESPIRATION RATE: 22 BRPM

## 2024-08-26 DIAGNOSIS — M96.1 LUMBAR POST-LAMINECTOMY SYNDROME: ICD-10-CM

## 2024-08-26 DIAGNOSIS — M53.3 CHRONIC RIGHT SI JOINT PAIN: Primary | ICD-10-CM

## 2024-08-26 DIAGNOSIS — G89.29 CHRONIC RIGHT SI JOINT PAIN: Primary | ICD-10-CM

## 2024-08-26 PROCEDURE — 1036F TOBACCO NON-USER: CPT | Performed by: PHYSICIAN ASSISTANT

## 2024-08-26 PROCEDURE — 1159F MED LIST DOCD IN RCRD: CPT | Performed by: PHYSICIAN ASSISTANT

## 2024-08-26 PROCEDURE — 99213 OFFICE O/P EST LOW 20 MIN: CPT | Performed by: PHYSICIAN ASSISTANT

## 2024-08-26 PROCEDURE — 1160F RVW MEDS BY RX/DR IN RCRD: CPT | Performed by: PHYSICIAN ASSISTANT

## 2024-08-26 PROCEDURE — 1125F AMNT PAIN NOTED PAIN PRSNT: CPT | Performed by: PHYSICIAN ASSISTANT

## 2024-08-26 PROCEDURE — 3074F SYST BP LT 130 MM HG: CPT | Performed by: PHYSICIAN ASSISTANT

## 2024-08-26 PROCEDURE — 1157F ADVNC CARE PLAN IN RCRD: CPT | Performed by: PHYSICIAN ASSISTANT

## 2024-08-26 PROCEDURE — 3079F DIAST BP 80-89 MM HG: CPT | Performed by: PHYSICIAN ASSISTANT

## 2024-08-26 ASSESSMENT — PATIENT HEALTH QUESTIONNAIRE - PHQ9
2. FEELING DOWN, DEPRESSED OR HOPELESS: NOT AT ALL
1. LITTLE INTEREST OR PLEASURE IN DOING THINGS: NOT AT ALL
SUM OF ALL RESPONSES TO PHQ9 QUESTIONS 1 & 2: 0

## 2024-08-26 ASSESSMENT — ENCOUNTER SYMPTOMS
EYES NEGATIVE: 1
ENDOCRINE NEGATIVE: 1
CONSTITUTIONAL NEGATIVE: 1
MYALGIAS: 1
PSYCHIATRIC NEGATIVE: 1
DIZZINESS: 1
CARDIOVASCULAR NEGATIVE: 1
ALLERGIC/IMMUNOLOGIC NEGATIVE: 1
GASTROINTESTINAL NEGATIVE: 1
HEMATOLOGIC/LYMPHATIC NEGATIVE: 1
ARTHRALGIAS: 1
BACK PAIN: 1
NUMBNESS: 1
RESPIRATORY NEGATIVE: 1
WEAKNESS: 1

## 2024-08-26 ASSESSMENT — PAIN DESCRIPTION - DESCRIPTORS: DESCRIPTORS: ACHING

## 2024-08-26 ASSESSMENT — PAIN SCALES - GENERAL: PAINLEVEL_OUTOF10: 2

## 2024-08-26 ASSESSMENT — PAIN - FUNCTIONAL ASSESSMENT: PAIN_FUNCTIONAL_ASSESSMENT: 0-10

## 2024-08-26 NOTE — PROGRESS NOTES
Subjective   Patient ID: Jossue Tam is a 79 y.o. male who presents for Back Pain.  Patient is a 79-year-old male here today for follow-up after his caudal epidural injection and right SI joint injection.  Patient notes that his pain levels are generally an 8 out of 10 and he reports both of these regions is now a 2 out of 10.  He has noticed some improvement in his quality of life since these procedures he denies any injuries but he has had some near falls.  He is being worked up for his dizziness with sit to stands and rapid head motions.    Back Pain  Associated symptoms include numbness and weakness.       Review of Systems   Constitutional: Negative.    HENT: Negative.     Eyes: Negative.    Respiratory: Negative.     Cardiovascular: Negative.    Gastrointestinal: Negative.    Endocrine: Negative.    Genitourinary: Negative.    Musculoskeletal:  Positive for arthralgias, back pain, gait problem and myalgias.   Skin: Negative.    Allergic/Immunologic: Negative.    Neurological:  Positive for dizziness, weakness and numbness.   Hematological: Negative.    Psychiatric/Behavioral: Negative.         Objective   Physical Exam  Vitals reviewed.   Constitutional:       Appearance: Normal appearance.   HENT:      Head: Normocephalic and atraumatic.      Mouth/Throat:      Mouth: Mucous membranes are moist.   Neck:      Vascular: No JVD.   Pulmonary:      Effort: Pulmonary effort is normal. No tachypnea or bradypnea.   Abdominal:      Palpations: Abdomen is soft.   Musculoskeletal:      Lumbar back: Tenderness present. Decreased range of motion. Negative right straight leg raise test and negative left straight leg raise test.   Skin:     General: Skin is warm and dry.   Neurological:      Mental Status: He is alert and oriented to person, place, and time.   Psychiatric:         Mood and Affect: Mood normal.         Behavior: Behavior normal. Behavior is cooperative.         Assessment/Plan   Problem List Items  Addressed This Visit             ICD-10-CM    Chronic right SI joint pain - Primary M53.3, G89.29    Lumbar post-laminectomy syndrome M96.1     Patient had successful right SI joint injection and a successful caudal epidural injection.  Patient's pain levels going from 8-10 down to 2 out of 10 successful reduction of greater than 75% relief.  We did talk about fall risk mitigation we did talk about pumping ankles prior to sit to stands.  We talked about using assistive devices to help prevent falls patient should continue to follow the workup for the dizziness issues       Hong Burton PA-C 08/26/24 1:45 PM

## 2024-09-03 ENCOUNTER — TELEPHONE (OUTPATIENT)
Dept: PRIMARY CARE | Facility: CLINIC | Age: 79
End: 2024-09-03
Payer: MEDICARE

## 2024-09-03 DIAGNOSIS — R42 DIZZINESS: ICD-10-CM

## 2024-09-03 DIAGNOSIS — K21.9 CHRONIC GERD: ICD-10-CM

## 2024-09-03 DIAGNOSIS — H93.13 TINNITUS OF BOTH EARS: ICD-10-CM

## 2024-09-03 DIAGNOSIS — R41.89 COGNITIVE IMPAIRMENT: Primary | ICD-10-CM

## 2024-09-03 DIAGNOSIS — M10.9 GOUTY ARTHROPATHY: ICD-10-CM

## 2024-09-03 DIAGNOSIS — G25.81 RESTLESS LEG: ICD-10-CM

## 2024-09-03 DIAGNOSIS — F33.0 MILD EPISODE OF RECURRENT MAJOR DEPRESSIVE DISORDER (CMS-HCC): ICD-10-CM

## 2024-09-03 NOTE — TELEPHONE ENCOUNTER
MEDICATION REFILL    Pt low on all meds     Pharmacy Name:   [facility]  Medication requested      Allopurinol  100 mg   1 X daily     Amlodipine   5 mg    1 X daily     Amitriptyline  25 mg   1 x daily      Donepezil   10 mg    1 at bedtime     Metoclopramide   10 mg  1 am / 1 pm     Ropinirole    25 mg    2 x daily  Dosage [see above]   Quantity     90 days   Allergies    none given  Date of last visit   07/29/2024  Date of Next Appointment   09/16/2024

## 2024-09-04 ENCOUNTER — TELEPHONE (OUTPATIENT)
Dept: PRIMARY CARE | Facility: CLINIC | Age: 79
End: 2024-09-04
Payer: MEDICARE

## 2024-09-04 RX ORDER — METOCLOPRAMIDE 10 MG/1
10 TABLET ORAL 4 TIMES DAILY
Qty: 180 TABLET | Refills: 1 | Status: SHIPPED | OUTPATIENT
Start: 2024-09-04 | End: 2024-12-03

## 2024-09-04 RX ORDER — ROPINIROLE 0.25 MG/1
0.25 TABLET, FILM COATED ORAL NIGHTLY
Qty: 30 TABLET | Refills: 0 | Status: SHIPPED | OUTPATIENT
Start: 2024-09-04 | End: 2024-10-04

## 2024-09-04 RX ORDER — ALLOPURINOL 100 MG/1
100 TABLET ORAL DAILY
Qty: 90 TABLET | Refills: 1 | Status: SHIPPED | OUTPATIENT
Start: 2024-09-04

## 2024-09-04 RX ORDER — DONEPEZIL HYDROCHLORIDE 10 MG/1
10 TABLET, FILM COATED ORAL NIGHTLY
Qty: 90 TABLET | Refills: 1 | Status: SHIPPED | OUTPATIENT
Start: 2024-09-04

## 2024-09-04 RX ORDER — AMITRIPTYLINE HYDROCHLORIDE 25 MG/1
25 TABLET, FILM COATED ORAL NIGHTLY
Qty: 90 TABLET | Refills: 1 | Status: SHIPPED | OUTPATIENT
Start: 2024-09-04 | End: 2025-03-03

## 2024-09-09 ENCOUNTER — APPOINTMENT (OUTPATIENT)
Dept: PRIMARY CARE | Facility: CLINIC | Age: 79
End: 2024-09-09
Payer: MEDICARE

## 2024-09-10 ENCOUNTER — TELEPHONE (OUTPATIENT)
Dept: PRIMARY CARE | Facility: CLINIC | Age: 79
End: 2024-09-10
Payer: MEDICARE

## 2024-09-10 NOTE — TELEPHONE ENCOUNTER
Can you please update patient that orders for blood work have been in place since June?  They are to test his kidney, liver, blood count, cholesterol, vitamin D, thyroid, prostate and inflammatory markers.  If he has any additional request regarding your blood work, please just let me know

## 2024-09-11 PROBLEM — N40.1 URINARY INCONTINENCE DUE TO BENIGN PROSTATIC HYPERPLASIA: Status: RESOLVED | Noted: 2024-09-11 | Resolved: 2024-09-11

## 2024-09-11 PROBLEM — M79.18 MUSCULOSKELETAL PAIN: Status: ACTIVE | Noted: 2024-09-11

## 2024-09-11 PROBLEM — E66.9 OBESITY WITH BODY MASS INDEX 30 OR GREATER: Status: ACTIVE | Noted: 2024-09-11

## 2024-09-11 PROBLEM — G89.29 CHRONIC PAIN: Status: ACTIVE | Noted: 2024-09-11

## 2024-09-11 PROBLEM — N39.498 URINARY INCONTINENCE DUE TO BENIGN PROSTATIC HYPERPLASIA: Status: RESOLVED | Noted: 2024-09-11 | Resolved: 2024-09-11
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
## 2024-09-11 PROBLEM — T14.8XXA FRACTURE OF BONE: Status: RESOLVED | Noted: 2024-09-11 | Resolved: 2024-09-11

## 2024-09-11 PROBLEM — Z79.85 LONG-TERM (CURRENT) USE OF INJECTABLE NON-INSULIN ANTIDIABETIC DRUGS: Status: ACTIVE | Noted: 2023-06-13

## 2024-09-11 PROBLEM — S41.109A: Status: RESOLVED | Noted: 2024-09-11 | Resolved: 2024-09-11

## 2024-09-11 PROBLEM — R73.9 HYPERGLYCEMIA: Status: ACTIVE | Noted: 2024-09-11

## 2024-09-11 PROBLEM — E27.49 DECREASED CORTISOL LEVEL: Status: ACTIVE | Noted: 2024-09-11

## 2024-09-11 PROBLEM — R29.6 FALLS: Status: ACTIVE | Noted: 2024-09-11
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
## 2024-09-12 ENCOUNTER — OFFICE VISIT (OUTPATIENT)
Dept: PAIN MEDICINE | Facility: CLINIC | Age: 79
End: 2024-09-12
Payer: MEDICARE

## 2024-09-12 ENCOUNTER — APPOINTMENT (OUTPATIENT)
Dept: PAIN MEDICINE | Facility: CLINIC | Age: 79
End: 2024-09-12
Payer: MEDICARE

## 2024-09-12 VITALS
SYSTOLIC BLOOD PRESSURE: 128 MMHG | HEART RATE: 85 BPM | RESPIRATION RATE: 18 BRPM | OXYGEN SATURATION: 94 % | DIASTOLIC BLOOD PRESSURE: 78 MMHG

## 2024-09-12 DIAGNOSIS — G89.4 CHRONIC PAIN SYNDROME: ICD-10-CM

## 2024-09-12 DIAGNOSIS — M48.062 SPINAL STENOSIS, LUMBAR REGION, WITH NEUROGENIC CLAUDICATION: Primary | ICD-10-CM

## 2024-09-12 DIAGNOSIS — M51.36 DDD (DEGENERATIVE DISC DISEASE), LUMBAR: ICD-10-CM

## 2024-09-12 DIAGNOSIS — M46.1 SACROILIITIS (CMS-HCC): ICD-10-CM

## 2024-09-12 PROCEDURE — 1036F TOBACCO NON-USER: CPT | Performed by: NURSE PRACTITIONER

## 2024-09-12 PROCEDURE — 3078F DIAST BP <80 MM HG: CPT | Performed by: NURSE PRACTITIONER

## 2024-09-12 PROCEDURE — 99214 OFFICE O/P EST MOD 30 MIN: CPT | Performed by: NURSE PRACTITIONER

## 2024-09-12 PROCEDURE — 1159F MED LIST DOCD IN RCRD: CPT | Performed by: NURSE PRACTITIONER

## 2024-09-12 PROCEDURE — 1157F ADVNC CARE PLAN IN RCRD: CPT | Performed by: NURSE PRACTITIONER

## 2024-09-12 PROCEDURE — 1125F AMNT PAIN NOTED PAIN PRSNT: CPT | Performed by: NURSE PRACTITIONER

## 2024-09-12 PROCEDURE — 3074F SYST BP LT 130 MM HG: CPT | Performed by: NURSE PRACTITIONER

## 2024-09-12 RX ORDER — HYDROCODONE BITARTRATE AND ACETAMINOPHEN 10; 325 MG/1; MG/1
1 TABLET ORAL EVERY 6 HOURS PRN
Qty: 120 TABLET | Refills: 0 | Status: SHIPPED | OUTPATIENT
Start: 2024-09-12 | End: 2024-10-12

## 2024-09-12 ASSESSMENT — ENCOUNTER SYMPTOMS
ARTHRALGIAS: 1
BACK PAIN: 1
COUGH: 0
SHORTNESS OF BREATH: 0
DYSURIA: 0
ABDOMINAL PAIN: 0
AGITATION: 0
SLEEP DISTURBANCE: 0
NUMBNESS: 1
PALPITATIONS: 0
DIARRHEA: 0
NAUSEA: 0
VOMITING: 0
CONFUSION: 0
HEMATOLOGIC/LYMPHATIC NEGATIVE: 1
CONSTITUTIONAL NEGATIVE: 1
ABDOMINAL DISTENTION: 0
DIFFICULTY URINATING: 0

## 2024-09-12 ASSESSMENT — PAIN - FUNCTIONAL ASSESSMENT: PAIN_FUNCTIONAL_ASSESSMENT: 0-10

## 2024-09-12 ASSESSMENT — PAIN SCALES - GENERAL
PAINLEVEL: 8
PAINLEVEL_OUTOF10: 8

## 2024-09-12 ASSESSMENT — PAIN DESCRIPTION - DESCRIPTORS: DESCRIPTORS: TINGLING;NUMBNESS;ACHING

## 2024-09-12 NOTE — PROGRESS NOTES
Subjective   Patient ID: Jossue Tam is a 79 y.o. male who presents for Back Pain.  HPI  Patient is here for a follow up and refill for his lumbar pain. He states his pain has been tolerable since his last injections.   He is needing refills today. He uses them prn when he has flare ups.   He states his pain is across the low back focused more to the right. They will be traveling back to [place] as the end of the month.     Review of Systems   Constitutional: Negative.    HENT: Negative.     Respiratory:  Negative for cough and shortness of breath.    Cardiovascular:  Negative for chest pain, palpitations and leg swelling.   Gastrointestinal:  Negative for abdominal distention, abdominal pain, diarrhea, nausea and vomiting.   Endocrine: Negative for cold intolerance and heat intolerance.   Genitourinary:  Negative for difficulty urinating, dysuria and urgency.   Musculoskeletal:  Positive for arthralgias and back pain.   Skin: Negative.    Neurological:  Positive for numbness.   Hematological: Negative.    Psychiatric/Behavioral:  Negative for agitation, confusion, sleep disturbance and suicidal ideas.        Objective   Physical Exam  Constitutional:       Appearance: Normal appearance.   Pulmonary:      Effort: Pulmonary effort is normal.   Musculoskeletal:      Lumbar back: Tenderness present. Decreased range of motion.   Skin:     General: Skin is warm.   Neurological:      Mental Status: He is alert and oriented to person, place, and time.         Assessment/Plan   Problem List Items Addressed This Visit             ICD-10-CM       Neuro    DDD (degenerative disc disease), lumbar M51.36    Relevant Medications    HYDROcodone-acetaminophen (Norco)  mg tablet    Spinal stenosis, lumbar region, with neurogenic claudication - Primary M48.062    Chronic pain G89.29     Other Visit Diagnoses         Codes    Sacroiliitis (CMS-[facility])     M46.1             I nice discussion with the patient today our plan will be  as follows.    Radiology: no new imaging needed at this time.     Physically:  has a HEP he does daily    Psychologically:   no concern at this time.     Medication: I will refill the patient's opioids today for 1 month.  The patient continues to see benefit and improvement in their quality of life and ability to maintain ADLs.  Patient educated about the risks of taking opioids and operating a motor vehicle.  Patient reports no adverse side effects to current medication regimen.  Current regimen does allow patient to maintain ADLs.  Patient reports no new neurologic symptoms, new pain areas, or exacerbation in pain today.  Patient reports they are happy with current treatment care path.    [place] was reviewed and was consistent with the history.    Patient has been educated on the risks, benefits, and alternatives of controlled substances as well as the proper way to store these medications.  The patient and I discussed the nature of this medication and its side effects.  We discussed tolerance, physical dependence, psychological dependence, addiction and opioid-induced hyperalgesia.  We discussed the potential need to wean from this medication.  We discussed the availability of programs that can help with this process if necessary.  We discussed safety issues related to opioids including safe storage.  We discussed the fact that the patient should not drive an automobile or operate heavy machinery while taking this medication.  A prescription for naloxone was offered to the patient.  The patient will be re-evaluated for the need to continue opioid therapy in 60-90 days.      Duration:  chronic on going    Intervention:  He has been doing well since last injection. He will call when he is ready for another. Follow up prn.       POLI Nguyen 09/12/24 2:55 PM

## 2024-09-12 NOTE — PROGRESS NOTES
MEDICATION NAME: hydrocodone  STRENGTH:   LAST FILL DATE: 2024  DATE LAST TAKEN: 2024  QUANTITY FILLED: 120  QUANTITY REMAININ  COUNT COMPLETED BY: KEVIN LANGE RN and DMOINIC CRUZ RN      UDS LAST COMPLETED:   CONTROLLED SUBSTANCES AGREEMENT LAST SIGNED:   ORT LAST COMPLETED:  Modified Oswestry disability form filled out annually.

## 2024-09-16 ENCOUNTER — APPOINTMENT (OUTPATIENT)
Dept: PRIMARY CARE | Facility: CLINIC | Age: 79
End: 2024-09-16
Payer: MEDICARE

## 2024-09-18 DIAGNOSIS — M25.50 CHRONIC PAIN OF MULTIPLE JOINTS: Primary | ICD-10-CM

## 2024-09-18 DIAGNOSIS — G89.29 CHRONIC PAIN OF MULTIPLE JOINTS: Primary | ICD-10-CM

## 2024-09-18 RX ORDER — LIDOCAINE 50 MG/G
1 PATCH TOPICAL SEE ADMIN INSTRUCTIONS
Qty: 60 PATCH | Refills: 2 | Status: SHIPPED | OUTPATIENT
Start: 2024-09-18 | End: 2024-10-18

## 2024-09-23 ENCOUNTER — APPOINTMENT (OUTPATIENT)
Dept: HEMATOLOGY/ONCOLOGY | Facility: CLINIC | Age: 79
End: 2024-09-23
Payer: MEDICARE

## 2024-09-23 VITALS
DIASTOLIC BLOOD PRESSURE: 73 MMHG | SYSTOLIC BLOOD PRESSURE: 132 MMHG | WEIGHT: 212.74 LBS | OXYGEN SATURATION: 95 % | HEIGHT: 66 IN | RESPIRATION RATE: 16 BRPM | TEMPERATURE: 97 F | HEART RATE: 87 BPM | BODY MASS INDEX: 34.19 KG/M2

## 2024-09-23 DIAGNOSIS — C83.30 DIFFUSE LARGE B-CELL LYMPHOMA, UNSPECIFIED BODY REGION (MULTI): Primary | ICD-10-CM

## 2024-09-23 PROCEDURE — 1157F ADVNC CARE PLAN IN RCRD: CPT | Performed by: INTERNAL MEDICINE

## 2024-09-23 PROCEDURE — 1159F MED LIST DOCD IN RCRD: CPT | Performed by: INTERNAL MEDICINE

## 2024-09-23 PROCEDURE — 99214 OFFICE O/P EST MOD 30 MIN: CPT | Performed by: INTERNAL MEDICINE

## 2024-09-23 PROCEDURE — 1125F AMNT PAIN NOTED PAIN PRSNT: CPT | Performed by: INTERNAL MEDICINE

## 2024-09-23 PROCEDURE — 3078F DIAST BP <80 MM HG: CPT | Performed by: INTERNAL MEDICINE

## 2024-09-23 PROCEDURE — 3075F SYST BP GE 130 - 139MM HG: CPT | Performed by: INTERNAL MEDICINE

## 2024-09-23 ASSESSMENT — PAIN SCALES - GENERAL: PAINLEVEL: 8

## 2024-09-23 NOTE — PATIENT INSTRUCTIONS
Today you met with your hematologist/oncologist.  Recent labs were discussed and questions answered.  Scheduling orders were placed.  While we appreciate that you verbalized understanding, if any questions arise after leaving, please do not hesitate to call the office to discuss.  [phone] Cam Barnes  Please have labs drawn every 6 months, those have been ordered for you as well as CT scan in one year. Dr Godinez will see you after to review those results.

## 2024-09-23 NOTE — PROGRESS NOTES
Patient ID: Jossue Tam is a 79 y.o. male.  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 On admission he was found to have GI bleeding that was associated with epigastric pain. He underwent  EGD on (2/11/21) that showed multiple tumors confirmed on biopsy to be DLBCL. He started cycle 1 of R-EPOCH on 2/11/21 and received rituxan on 2/16/2021. Cycle 1 was complicated by prednisone-induced restlessness, anxiety, and irritability requiring lorazepam.  Dexamethasone given instead of prednisone with C2 with no reactions   2/22/21 CT scan, size of mass possibly decreased , slight decrease in liver and spleen size, LAD stable   5/3/21 PET scan after 4 cycles R-EPOCH interval decrease in the size of the renal mass with mild surrounding hypermetabolic soft tissue, SUV 3.5, no hypermetabolic LN   Tumor board review on 5/13/21 indicated a probable CR   Continue on 2 more cycles of R-EPOCH , last 2 cycles with 50% dose reduction of Vincristine due to neuropathy   Completed C6 of R-EPOCH on 6/5/21   Received 2 doses of IT MTX march 2021 and May 2021  6/16/21 EGD: no residual masses, areas of scar, negative  "path, residual food possible gastroparesis   6/23/21 post treatment PET- CR (Deauville 2): continue interval decrease in soft tissue / left renal mass metabolic activity   Received CT scans Q 4 months for surveillance   May 2022 CT C/A/P : decreased mass to around 3.7 x 2.5 cm compared to prior CT scan, no new LAD   June 2022 MRI  brain due to dizziness and confusion and hx of hallucinations: moderate volume of chronic ischemic white matter changes , mild parenchymal loss, possible NPH   9/8/22 CT C/A/P: stability of kidney findings - CHRISTINA  Review of Systems   Constitutional: Negative.    HENT:  Negative.     Respiratory: Negative.     Cardiovascular: Negative.    Gastrointestinal: Negative.    Neurological:  Positive for numbness.        Objective   BSA: 2.11 meters squared  /73 (BP Location: Left arm, Patient Position: Sitting, BP Cuff Size: Adult)   Pulse 87   Temp 36.1 °C (97 °F) (Temporal)   Resp 16   Ht (S) 1.668 m (5' 5.67\")   Wt 96.5 kg (212 lb 11.9 oz)   SpO2 95%   BMI 34.68 kg/m²      has a past medical history of Back abscess (05/30/2023), Fracture of bone (09/11/2024), Hallucinations, visual (05/30/2023), and Urinary incontinence due to benign prostatic hyperplasia (09/11/2024).   has a past surgical history that includes Other surgical history (06/24/2021).  No family history on file.  Oncology History    No history exists.       Jossue Tam  reports that he quit smoking about 41 years ago. His smoking use included cigarettes. He has never used smokeless tobacco.  He  reports that he does not currently use alcohol.  He  reports no history of drug use.    Physical Exam  Constitutional:       Appearance: Normal appearance.   HENT:      Head: Normocephalic and atraumatic.   Eyes:      Extraocular Movements: Extraocular movements intact.      Pupils: Pupils are equal, round, and reactive to light.   Neurological:      Mental Status: He is alert.         WBC   Date/Time Value Ref Range Status "   07/16/2024 04:19 PM 9.3 4.4 - 11.3 x10*3/uL Final   07/27/2023 09:40 AM 9.0 4.4 - 11.3 x10E9/L Final   06/29/2023 12:35 PM 11.0 4.4 - 11.3 x10E9/L Final   08/29/2022 09:38 AM 7.4 4.4 - 11.3 x10E9/L Final     nRBC   Date Value Ref Range Status   07/16/2024 0.0 0.0 - 0.0 /100 WBCs Final   06/05/2021 0.0 0.0 - 0.0 /100 WBC Final   06/04/2021 0.0 0.0 - 0.0 /100 WBC Final   06/03/2021 0.0 0.0 - 0.0 /100 WBC Final     RBC   Date Value Ref Range Status   07/16/2024 5.09 4.50 - 5.90 x10*6/uL Final   07/27/2023 4.83 4.50 - 5.90 x10E12/L Final   06/29/2023 4.95 4.50 - 5.90 x10E12/L Final   08/29/2022 4.97 4.50 - 5.90 x10E12/L Final     Hemoglobin   Date Value Ref Range Status   07/16/2024 15.3 13.5 - 17.5 g/dL Final   07/27/2023 15.0 13.5 - 17.5 g/dL Final   06/29/2023 15.2 13.5 - 17.5 g/dL Final   08/29/2022 15.0 13.5 - 17.5 g/dL Final     Hematocrit   Date Value Ref Range Status   07/16/2024 46.1 41.0 - 52.0 % Final   07/27/2023 45.7 41.0 - 52.0 % Final   06/29/2023 47.4 41.0 - 52.0 % Final   08/29/2022 45.8 41.0 - 52.0 % Final     MCV   Date/Time Value Ref Range Status   07/16/2024 04:19 PM 91 80 - 100 fL Final   07/27/2023 09:40 AM 95 80 - 100 fL Final   06/29/2023 12:35 PM 96 80 - 100 fL Final   08/29/2022 09:38 AM 92 80 - 100 fL Final     MCH   Date/Time Value Ref Range Status   07/16/2024 04:19 PM 30.1 26.0 - 34.0 pg Final   05/29/2020 08:50 AM 29.6 26 - 34 PG Final   08/01/2018 03:58 PM 30.0 26 - 34 PG Final     MCHC   Date/Time Value Ref Range Status   07/16/2024 04:19 PM 33.2 32.0 - 36.0 g/dL Final   07/27/2023 09:40 AM 32.8 32.0 - 36.0 g/dL Final   06/29/2023 12:35 PM 32.1 32.0 - 36.0 g/dL Final   08/29/2022 09:38 AM 32.8 32.0 - 36.0 g/dL Final     RDW   Date/Time Value Ref Range Status   07/16/2024 04:19 PM 13.6 11.5 - 14.5 % Final   07/27/2023 09:40 AM 13.8 11.5 - 14.5 % Final   06/29/2023 12:35 PM 13.8 11.5 - 14.5 % Final   08/29/2022 09:38 AM 14.3 11.5 - 14.5 % Final     Platelets   Date/Time Value Ref Range  Status   07/16/2024 04:19  150 - 450 x10*3/uL Final   07/27/2023 09:40  150 - 450 x10E9/L Final   06/29/2023 12:35  150 - 450 x10E9/L Final   08/29/2022 09:38  150 - 450 x10E9/L Final     MPV   Date/Time Value Ref Range Status   05/29/2020 08:50 AM 10.4 7.0 - 12.6 CU Final   08/01/2018 03:58 PM 10.8 7.0 - 12.6 CU Final     Neutrophils %   Date/Time Value Ref Range Status   07/16/2024 04:19 PM 52.9 40.0 - 80.0 % Final   07/27/2023 09:40 AM 59.8 40.0 - 80.0 % Final   06/29/2023 12:35 PM 62.0 40.0 - 80.0 % Final   08/29/2022 09:38 AM 64.9 40.0 - 80.0 % Final     Immature Granulocytes %, Automated   Date/Time Value Ref Range Status   07/16/2024 04:19 PM 0.5 0.0 - 0.9 % Final     Comment:     Immature Granulocyte Count (IG) includes promyelocytes, myelocytes and metamyelocytes but does not include bands. Percent differential counts (%) should be interpreted in the context of the absolute cell counts (cells/UL).   07/27/2023 09:40 AM 0.9 0.0 - 0.9 % Final     Comment:      Immature Granulocyte Count (IG) includes promyelocytes,    myelocytes and metamyelocytes but does not include bands.   Percent differential counts (%) should be interpreted in the   context of the absolute cell counts (cells/L).     06/29/2023 12:35 PM 1.1 (H) 0.0 - 0.9 % Final     Comment:      Immature Granulocyte Count (IG) includes promyelocytes,    myelocytes and metamyelocytes but does not include bands.   Percent differential counts (%) should be interpreted in the   context of the absolute cell counts (cells/L).     08/29/2022 09:38 AM 1.1 (H) 0.0 - 0.9 % Final     Comment:      Immature Granulocyte Count (IG) includes promyelocytes,    myelocytes and metamyelocytes but does not include bands.   Percent differential counts (%) should be interpreted in the   context of the absolute cell counts (cells/L).       Lymphocytes %   Date/Time Value Ref Range Status   07/16/2024 04:19 PM 37.0 13.0 - 44.0 % Final   07/27/2023 09:40  AM 30.0 13.0 - 44.0 % Final     Comment:      Percent differential counts (%) should be interpreted in the   context of the absolute cell counts (cells/L).     06/29/2023 12:35 PM 28.9 13.0 - 44.0 % Final     Comment:      Percent differential counts (%) should be interpreted in the   context of the absolute cell counts (cells/L).     08/29/2022 09:38 AM 23.7 13.0 - 44.0 % Final     Comment:      Percent differential counts (%) should be interpreted in the   context of the absolute cell counts (cells/L).     06/17/2021 08:26 AM 6.0 13.0 - 44.0 % Final   06/15/2021 10:20 AM 4.0 13.0 - 44.0 % Final   06/10/2021 12:18 PM 22.0 13.0 - 44.0 % Final     Monocytes %   Date/Time Value Ref Range Status   07/16/2024 04:19 PM 6.8 2.0 - 10.0 % Final   07/27/2023 09:40 AM 7.2 2.0 - 10.0 % Final   06/29/2023 12:35 PM 6.0 2.0 - 10.0 % Final   08/29/2022 09:38 AM 8.0 2.0 - 10.0 % Final   06/17/2021 08:26 AM 3.0 2.0 - 10.0 % Final   06/15/2021 10:20 AM 4.0 2.0 - 10.0 % Final   06/10/2021 12:18 PM 20.0 2.0 - 10.0 % Final     Eosinophils %   Date/Time Value Ref Range Status   07/16/2024 04:19 PM 2.4 0.0 - 6.0 % Final   07/27/2023 09:40 AM 1.8 0.0 - 6.0 % Final   06/29/2023 12:35 PM 1.6 0.0 - 6.0 % Final   08/29/2022 09:38 AM 1.9 0.0 - 6.0 % Final   06/17/2021 08:26 AM 0.0 0.0 - 6.0 % Final   06/15/2021 10:20 AM 0.0 0.0 - 6.0 % Final   06/10/2021 12:18 PM 1.0 0.0 - 6.0 % Final     Basophils %   Date/Time Value Ref Range Status   07/16/2024 04:19 PM 0.4 0.0 - 2.0 % Final   07/27/2023 09:40 AM 0.3 0.0 - 2.0 % Final   06/29/2023 12:35 PM 0.4 0.0 - 2.0 % Final   08/29/2022 09:38 AM 0.4 0.0 - 2.0 % Final   06/17/2021 08:26 AM 1.0 0.0 - 2.0 % Final   06/15/2021 10:20 AM 0.0 0.0 - 2.0 % Final   06/10/2021 12:18 PM 4.0 0.0 - 2.0 % Final     Neutrophils Absolute   Date/Time Value Ref Range Status   07/16/2024 04:19 PM 4.89 1.60 - 5.50 x10*3/uL Final     Comment:     Percent differential counts (%) should be interpreted in the context of the  absolute cell counts (cells/uL).   07/27/2023 09:40 AM 5.38 1.60 - 5.50 x10E9/L Final   06/29/2023 12:35 PM 6.82 (H) 1.60 - 5.50 x10E9/L Final   08/29/2022 09:38 AM 4.80 1.60 - 5.50 x10E9/L Final     Immature Granulocytes Absolute, Automated   Date/Time Value Ref Range Status   07/16/2024 04:19 PM 0.05 0.00 - 0.50 x10*3/uL Final   05/29/2020 08:50 AM SAMPLE PROBLEM SUSPECTED 0.0 - 0.1 K/UL Corrected     Comment:      BAD SAMPLE USED, RESULTS NOT RELIABLE  CORRECTED ON 05/31 AT 1405: PREVIOUSLY REPORTED AS 0.08     08/01/2018 03:58 PM 0.12 (H) 0.0 - 0.1 K/UL Final     Lymphocytes Absolute   Date/Time Value Ref Range Status   07/16/2024 04:19 PM 3.42 (H) 0.80 - 3.00 x10*3/uL Final   07/27/2023 09:40 AM 2.70 0.80 - 3.00 x10E9/L Final   06/29/2023 12:35 PM 3.18 (H) 0.80 - 3.00 x10E9/L Final   08/29/2022 09:38 AM 1.75 0.80 - 3.00 x10E9/L Final     Monocytes Absolute   Date/Time Value Ref Range Status   07/16/2024 04:19 PM 0.63 0.05 - 0.80 x10*3/uL Final   07/27/2023 09:40 AM 0.65 0.05 - 0.80 x10E9/L Final   06/29/2023 12:35 PM 0.66 0.05 - 0.80 x10E9/L Final   08/29/2022 09:38 AM 0.59 0.05 - 0.80 x10E9/L Final     Eosinophils Absolute   Date/Time Value Ref Range Status   07/16/2024 04:19 PM 0.22 0.00 - 0.40 x10*3/uL Final   07/27/2023 09:40 AM 0.16 0.00 - 0.40 x10E9/L Final   06/29/2023 12:35 PM 0.18 0.00 - 0.40 x10E9/L Final   08/29/2022 09:38 AM 0.14 0.00 - 0.40 x10E9/L Final   06/17/2021 08:26 AM 0.00 0.00 - 0.40 x10E9/L Final   06/15/2021 10:20 AM 0.00 0.00 - 0.40 x10E9/L Final   06/10/2021 12:18 PM 0.02 0.00 - 0.40 x10E9/L Final     Basophils Absolute   Date/Time Value Ref Range Status   07/16/2024 04:19 PM 0.04 0.00 - 0.10 x10*3/uL Final   07/27/2023 09:40 AM 0.03 0.00 - 0.10 x10E9/L Final   06/29/2023 12:35 PM 0.04 0.00 - 0.10 x10E9/L Final   08/29/2022 09:38 AM 0.03 0.00 - 0.10 x10E9/L Final   06/17/2021 08:26 AM 0.11 (H) 0.00 - 0.10 x10E9/L Final   06/15/2021 10:20 AM 0.00 0.00 - 0.10 x10E9/L Final   06/10/2021  "12:18 PM 0.06 0.00 - 0.10 x10E9/L Final       No components found for: \"PT\"  aPTT   Date/Time Value Ref Range Status   05/07/2021 02:05 PM 26 25 - 35 sec Final     Comment:       THE APTT IS NO LONGER USED FOR MONITORING     UNFRACTIONATED HEPARIN THERAPY.    FOR MONITORING HEPARIN THERAPY,     USE THE HEPARIN ASSAY.     04/16/2021 06:18 PM 34 25 - 35 sec Final     Comment:       THE APTT IS NO LONGER USED FOR MONITORING     UNFRACTIONATED HEPARIN THERAPY.    FOR MONITORING HEPARIN THERAPY,     USE THE HEPARIN ASSAY.     03/26/2021 04:59 PM 36 (H) 25 - 35 sec Final     Comment:       THE APTT IS NO LONGER USED FOR MONITORING     UNFRACTIONATED HEPARIN THERAPY.    FOR MONITORING HEPARIN THERAPY,     USE THE HEPARIN ASSAY.       IMPRESSION:  Lymphoma restaging scan. When compared to the prior examination dated  07/13/2023, there has been no significant interval change with a stable irregular soft tissue focus between the left kidney and spleen likely representing an area of treated disease. No definite evidence of new malignancy. Moderate hepatomegaly and hepatic steatosis, correlate with liver function tests. Additional stable chronic and  incidental findings as described above.      Signed by: Shane Hernandez 7/27/2024    Assessment/Plan      1. 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 BCL6 re-arrangements      s/p treatment as in HPI   received 6 cycles of R-EPOCH and 2 doses of IT MTX, post treatment PET in June 2021 with CR - Deauville score of 2  he is receiving CT scans and some PET scans done in [place] at [place] [facility] [place]   will obtain imaging report of most recent scan   we will also update the CT scans, he will be completing 2 yrs now post treatment   we discussed that guidelines do not recommend further imaging beyond 2 yrs but we can consider Q 6-12 m scan for another 3 yrs given aggressive histology   he has some upper GI symptoms which can be secondary to " gastroparesis,  he reported having an EGD earlier this year and will see if we can get records, otherwise refer to EGD due to prior stomach involvement with lymphoma     7/27- CT scans showed CHRISTINA - done 7/13/23   otherwise has done well   we were able to obtain records from oncologist in [place], but not from GI for eGD, in all cases patient said he has a report he will send to us, otherwise no worsened symptoms      he will see oncologist in [place] when he goes there in the winter and will schedule a yearly follow up with scan for July 2025      2. Peripheral neuropathy   hx of DM and neuropathy secondary due to vincristine :     Recommend water excercises: RTC after CT CAP in 2025 9/23/24: Recommend water excercises: RTC after CT CAP in 2025: CHRISTINA at this time.    Diagnoses and all orders for this visit:  Diffuse large B-cell lymphoma, unspecified body region (Multi)  -     CT chest abdomen pelvis w IV contrast; Future  -     Serum Protein Electrophoresis; Standing  -     CBC and Auto Differential; Standing  -     Comprehensive Metabolic Panel; Standing  -     Clinic Appointment Request Follow Up (review CT); Future           Bertram-Navid Godinze MD

## 2024-09-25 ASSESSMENT — ENCOUNTER SYMPTOMS
CARDIOVASCULAR NEGATIVE: 1
CONSTITUTIONAL NEGATIVE: 1
GASTROINTESTINAL NEGATIVE: 1
RESPIRATORY NEGATIVE: 1
NUMBNESS: 1

## 2025-06-11 ENCOUNTER — APPOINTMENT (OUTPATIENT)
Dept: PAIN MEDICINE | Facility: CLINIC | Age: 80
End: 2025-06-11
Payer: MEDICARE

## 2025-07-10 ENCOUNTER — OFFICE VISIT (OUTPATIENT)
Facility: CLINIC | Age: 80
End: 2025-07-10
Payer: MEDICARE

## 2025-07-10 VITALS
OXYGEN SATURATION: 99 % | BODY MASS INDEX: 35.31 KG/M2 | HEIGHT: 67 IN | WEIGHT: 225 LBS | DIASTOLIC BLOOD PRESSURE: 90 MMHG | HEART RATE: 82 BPM | RESPIRATION RATE: 24 BRPM | SYSTOLIC BLOOD PRESSURE: 140 MMHG

## 2025-07-10 DIAGNOSIS — M54.16 LUMBAR RADICULITIS: ICD-10-CM

## 2025-07-10 DIAGNOSIS — M46.1 SACROILIITIS: ICD-10-CM

## 2025-07-10 DIAGNOSIS — Z79.899 HISTORY OF ONGOING TREATMENT WITH HIGH-RISK MEDICATION: Primary | ICD-10-CM

## 2025-07-10 DIAGNOSIS — M51.369 DDD (DEGENERATIVE DISC DISEASE), LUMBAR: ICD-10-CM

## 2025-07-10 PROCEDURE — 3080F DIAST BP >= 90 MM HG: CPT | Performed by: NURSE PRACTITIONER

## 2025-07-10 PROCEDURE — 99214 OFFICE O/P EST MOD 30 MIN: CPT | Performed by: NURSE PRACTITIONER

## 2025-07-10 PROCEDURE — 1159F MED LIST DOCD IN RCRD: CPT | Performed by: NURSE PRACTITIONER

## 2025-07-10 PROCEDURE — 1157F ADVNC CARE PLAN IN RCRD: CPT | Performed by: NURSE PRACTITIONER

## 2025-07-10 PROCEDURE — 3077F SYST BP >= 140 MM HG: CPT | Performed by: NURSE PRACTITIONER

## 2025-07-10 PROCEDURE — 1036F TOBACCO NON-USER: CPT | Performed by: NURSE PRACTITIONER

## 2025-07-10 PROCEDURE — 1160F RVW MEDS BY RX/DR IN RCRD: CPT | Performed by: NURSE PRACTITIONER

## 2025-07-10 RX ORDER — HYDROCODONE BITARTRATE AND ACETAMINOPHEN 10; 325 MG/1; MG/1
1 TABLET ORAL EVERY 6 HOURS PRN
Qty: 120 TABLET | Refills: 0 | Status: SHIPPED | OUTPATIENT
Start: 2025-07-10 | End: 2025-08-09

## 2025-07-10 ASSESSMENT — ENCOUNTER SYMPTOMS
NAUSEA: 0
VOMITING: 0
SHORTNESS OF BREATH: 0
DIARRHEA: 0
ABDOMINAL DISTENTION: 0
CONFUSION: 0
COUGH: 0
SLEEP DISTURBANCE: 0
HEMATOLOGIC/LYMPHATIC NEGATIVE: 1
NEUROLOGICAL NEGATIVE: 1
DIFFICULTY URINATING: 0
AGITATION: 0
CONSTITUTIONAL NEGATIVE: 1
ABDOMINAL PAIN: 0
DYSURIA: 0
BACK PAIN: 1
PALPITATIONS: 0

## 2025-07-10 ASSESSMENT — PAIN SCALES - GENERAL
PAINLEVEL_OUTOF10: 5 - MODERATE PAIN
PAINLEVEL_OUTOF10: 5

## 2025-07-10 ASSESSMENT — PATIENT HEALTH QUESTIONNAIRE - PHQ9
1. LITTLE INTEREST OR PLEASURE IN DOING THINGS: NOT AT ALL
SUM OF ALL RESPONSES TO PHQ9 QUESTIONS 1 & 2: 0
2. FEELING DOWN, DEPRESSED OR HOPELESS: NOT AT ALL

## 2025-07-10 ASSESSMENT — PAIN - FUNCTIONAL ASSESSMENT: PAIN_FUNCTIONAL_ASSESSMENT: 0-10

## 2025-07-10 NOTE — H&P (VIEW-ONLY)
Subjective   Patient ID: Jossue Tam is a 80 y.o. male who presents for Back Pain.  Back Pain  Pertinent negatives include no abdominal pain, chest pain or dysuria.     Patient is here for a follow up and refill for his low back pain. He lives half of the year in [facility] and they are back home for the summer. He uses his medications prn. He is interested in repeating the caudal AYLEEN and right SI joint. He had these over a year ago and has had about 60% improvement until recently.       Review of Systems   Constitutional: Negative.    HENT: Negative.     Respiratory:  Negative for cough and shortness of breath.    Cardiovascular:  Negative for chest pain, palpitations and leg swelling.   Gastrointestinal:  Negative for abdominal distention, abdominal pain, diarrhea, nausea and vomiting.   Endocrine: Negative for cold intolerance and heat intolerance.   Genitourinary:  Negative for difficulty urinating, dysuria and urgency.   Musculoskeletal:  Positive for back pain.   Skin: Negative.    Neurological: Negative.    Hematological: Negative.    Psychiatric/Behavioral:  Negative for agitation, confusion, sleep disturbance and suicidal ideas.        Objective   Physical Exam  Constitutional:       Appearance: Normal appearance.   Musculoskeletal:      Lumbar back: Tenderness present. Decreased range of motion.      Comments: + odell, + thigh thrust, + compression test   Neurological:      Mental Status: He is alert and oriented to person, place, and time.      Sensory: Sensory deficit present.   Psychiatric:         Mood and Affect: Mood normal.         Behavior: Behavior normal.         Assessment/Plan   Problem List Items Addressed This Visit           ICD-10-CM       Neuro    DDD (degenerative disc disease), lumbar M51.369    Relevant Medications    HYDROcodone-acetaminophen (Norco)  mg tablet     Other Visit Diagnoses         Codes      History of ongoing treatment with high-risk medication    -  Primary Z79.899     Relevant Orders    Opiate/Opioid/Benzo Prescription Compliance      Lumbar radiculitis     M54.16    Relevant Orders    Epidural Steroid Injection    [place] pain management      Sacroiliitis     M46.1    Relevant Orders    Sacroiliac Joint Injection    [place] pain management             I nice discussion with the patient today our plan will be as follows.    Radiology: no new imaging needed.     Physically:  Continues with a daily home exercise plan.     Psychologically:  no concern.     Medication: I will refill the patient's opioids today for 1 month.  The patient continues to see benefit and improvement in their quality of life and ability to maintain ADLs.  Patient educated about the risks of taking opioids and operating a motor vehicle.  Patient reports no adverse side effects to current medication regimen.  Current regimen does allow patient to maintain ADLs.  Patient reports no new neurologic symptoms, new pain areas, or exacerbation in pain today.  Patient reports they are happy with current treatment care path.    OARRS was reviewed and was consistent with the history.    Patient has been educated on the risks, benefits, and alternatives of controlled substances as well as the proper way to store these medications.  The patient and I discussed the nature of this medication and its side effects.  We discussed tolerance, physical dependence, psychological dependence, addiction and opioid-induced hyperalgesia.  We discussed the potential need to wean from this medication.  We discussed the availability of programs that can help with this process if necessary.  We discussed safety issues related to opioids including safe storage.  We discussed the fact that the patient should not drive an automobile or operate heavy machinery while taking this medication.  A prescription for naloxone was offered to the patient.  The patient will be re-evaluated for the need to continue opioid therapy in 60-90 days.      Duration:   chronic on going.     Intervention:  We will plan on repeating the right SI joint injection as well as the caudal AYLEEN since he had a great response to the last ones. Follow up 2 weeks after      POLI Nguyen 07/10/25 4:43 PM

## 2025-07-10 NOTE — PROGRESS NOTES
Subjective   Patient ID: Jossue Tam is a 80 y.o. male who presents for Back Pain.  Back Pain  Pertinent negatives include no abdominal pain, chest pain or dysuria.     Patient is here for a follow up and refill for his low back pain. He lives half of the year in [facility] and they are back home for the summer. He uses his medications prn. He is interested in repeating the caudal AYLEEN and right SI joint. He had these over a year ago and has had about 60% improvement until recently.       Review of Systems   Constitutional: Negative.    HENT: Negative.     Respiratory:  Negative for cough and shortness of breath.    Cardiovascular:  Negative for chest pain, palpitations and leg swelling.   Gastrointestinal:  Negative for abdominal distention, abdominal pain, diarrhea, nausea and vomiting.   Endocrine: Negative for cold intolerance and heat intolerance.   Genitourinary:  Negative for difficulty urinating, dysuria and urgency.   Musculoskeletal:  Positive for back pain.   Skin: Negative.    Neurological: Negative.    Hematological: Negative.    Psychiatric/Behavioral:  Negative for agitation, confusion, sleep disturbance and suicidal ideas.        Objective   Physical Exam  Constitutional:       Appearance: Normal appearance.   Musculoskeletal:      Lumbar back: Tenderness present. Decreased range of motion.      Comments: + odell, + thigh thrust, + compression test   Neurological:      Mental Status: He is alert and oriented to person, place, and time.      Sensory: Sensory deficit present.   Psychiatric:         Mood and Affect: Mood normal.         Behavior: Behavior normal.         Assessment/Plan   Problem List Items Addressed This Visit           ICD-10-CM       Neuro    DDD (degenerative disc disease), lumbar M51.369    Relevant Medications    HYDROcodone-acetaminophen (Norco)  mg tablet     Other Visit Diagnoses         Codes      History of ongoing treatment with high-risk medication    -  Primary Z79.899     Relevant Orders    Opiate/Opioid/Benzo Prescription Compliance      Lumbar radiculitis     M54.16    Relevant Orders    Epidural Steroid Injection    [place] pain management      Sacroiliitis     M46.1    Relevant Orders    Sacroiliac Joint Injection    [place] pain management             I nice discussion with the patient today our plan will be as follows.    Radiology: no new imaging needed.     Physically:  Continues with a daily home exercise plan.     Psychologically:  no concern.     Medication: I will refill the patient's opioids today for 1 month.  The patient continues to see benefit and improvement in their quality of life and ability to maintain ADLs.  Patient educated about the risks of taking opioids and operating a motor vehicle.  Patient reports no adverse side effects to current medication regimen.  Current regimen does allow patient to maintain ADLs.  Patient reports no new neurologic symptoms, new pain areas, or exacerbation in pain today.  Patient reports they are happy with current treatment care path.    OARRS was reviewed and was consistent with the history.    Patient has been educated on the risks, benefits, and alternatives of controlled substances as well as the proper way to store these medications.  The patient and I discussed the nature of this medication and its side effects.  We discussed tolerance, physical dependence, psychological dependence, addiction and opioid-induced hyperalgesia.  We discussed the potential need to wean from this medication.  We discussed the availability of programs that can help with this process if necessary.  We discussed safety issues related to opioids including safe storage.  We discussed the fact that the patient should not drive an automobile or operate heavy machinery while taking this medication.  A prescription for naloxone was offered to the patient.  The patient will be re-evaluated for the need to continue opioid therapy in 60-90 days.      Duration:   chronic on going.     Intervention:  We will plan on repeating the right SI joint injection as well as the caudal AYLEEN since he had a great response to the last ones. Follow up 2 weeks after      POLI Nguyen 07/10/25 4:43 PM

## 2025-07-10 NOTE — H&P (VIEW-ONLY)
Subjective   Patient ID: Jossue Tam is a 80 y.o. male who presents for Back Pain.  Back Pain  Pertinent negatives include no abdominal pain, chest pain or dysuria.     Patient is here for a follow up and refill for his low back pain. He lives half of the year in [facility] and they are back home for the summer. He uses his medications prn. He is interested in repeating the caudal AYLEEN and right SI joint. He had these over a year ago and has had about 60% improvement until recently.       Review of Systems   Constitutional: Negative.    HENT: Negative.     Respiratory:  Negative for cough and shortness of breath.    Cardiovascular:  Negative for chest pain, palpitations and leg swelling.   Gastrointestinal:  Negative for abdominal distention, abdominal pain, diarrhea, nausea and vomiting.   Endocrine: Negative for cold intolerance and heat intolerance.   Genitourinary:  Negative for difficulty urinating, dysuria and urgency.   Musculoskeletal:  Positive for back pain.   Skin: Negative.    Neurological: Negative.    Hematological: Negative.    Psychiatric/Behavioral:  Negative for agitation, confusion, sleep disturbance and suicidal ideas.        Objective   Physical Exam  Constitutional:       Appearance: Normal appearance.   Musculoskeletal:      Lumbar back: Tenderness present. Decreased range of motion.      Comments: + odell, + thigh thrust, + compression test   Neurological:      Mental Status: He is alert and oriented to person, place, and time.      Sensory: Sensory deficit present.   Psychiatric:         Mood and Affect: Mood normal.         Behavior: Behavior normal.         Assessment/Plan   Problem List Items Addressed This Visit           ICD-10-CM       Neuro    DDD (degenerative disc disease), lumbar M51.369    Relevant Medications    HYDROcodone-acetaminophen (Norco)  mg tablet     Other Visit Diagnoses         Codes      History of ongoing treatment with high-risk medication    -  Primary Z79.899     Relevant Orders    Opiate/Opioid/Benzo Prescription Compliance      Lumbar radiculitis     M54.16    Relevant Orders    Epidural Steroid Injection    [place] pain management      Sacroiliitis     M46.1    Relevant Orders    Sacroiliac Joint Injection    [place] pain management             I nice discussion with the patient today our plan will be as follows.    Radiology: no new imaging needed.     Physically:  Continues with a daily home exercise plan.     Psychologically:  no concern.     Medication: I will refill the patient's opioids today for 1 month.  The patient continues to see benefit and improvement in their quality of life and ability to maintain ADLs.  Patient educated about the risks of taking opioids and operating a motor vehicle.  Patient reports no adverse side effects to current medication regimen.  Current regimen does allow patient to maintain ADLs.  Patient reports no new neurologic symptoms, new pain areas, or exacerbation in pain today.  Patient reports they are happy with current treatment care path.    [place] was reviewed and was consistent with the history.    Patient has been educated on the risks, benefits, and alternatives of controlled substances as well as the proper way to store these medications.  The patient and I discussed the nature of this medication and its side effects.  We discussed tolerance, physical dependence, psychological dependence, addiction and opioid-induced hyperalgesia.  We discussed the potential need to wean from this medication.  We discussed the availability of programs that can help with this process if necessary.  We discussed safety issues related to opioids including safe storage.  We discussed the fact that the patient should not drive an automobile or operate heavy machinery while taking this medication.  A prescription for naloxone was offered to the patient.  The patient will be re-evaluated for the need to continue opioid therapy in 60-90 days.      Duration:   chronic on going.     Intervention:  We will plan on repeating the right SI joint injection as well as the caudal AYLEEN since he had a great response to the last ones. Follow up 2 weeks after      POLI Nguyen 07/10/25 4:43 PM

## 2025-07-13 LAB
1OH-MIDAZOLAM UR-MCNC: NEGATIVE NG/ML
7AMINOCLONAZEPAM UR-MCNC: NEGATIVE NG/ML
A-OH ALPRAZ UR-MCNC: NEGATIVE NG/ML
A-OH-TRIAZOLAM UR-MCNC: NEGATIVE NG/ML
AMPHETAMINES UR QL: NEGATIVE NG/ML
BARBITURATES UR QL: NEGATIVE NG/ML
BZE UR QL: NEGATIVE NG/ML
CODEINE UR-MCNC: NEGATIVE NG/ML
CREAT UR-MCNC: 48.6 MG/DL
DRUG SCREEN COMMENT UR-IMP: NORMAL
EDDP UR-MCNC: NEGATIVE NG/ML
FENTANYL UR-MCNC: NORMAL NG/ML
HYDROCODONE UR-MCNC: NEGATIVE NG/ML
HYDROMORPHONE UR-MCNC: NEGATIVE NG/ML
LORAZEPAM UR-MCNC: NEGATIVE NG/ML
METHADONE UR-MCNC: NEGATIVE NG/ML
MORPHINE UR-MCNC: NEGATIVE NG/ML
NORDIAZEPAM UR-MCNC: NEGATIVE NG/ML
NORFENTANYL UR-MCNC: NORMAL NG/ML
NORHYDROCODONE UR CFM-MCNC: NEGATIVE NG/ML
NOROXYCODONE UR CFM-MCNC: NEGATIVE NG/ML
NORTRAMADOL UR-MCNC: NEGATIVE NG/ML
OH-ETHYLFLURAZ UR-MCNC: NEGATIVE NG/ML
OXAZEPAM UR-MCNC: NEGATIVE NG/ML
OXIDANTS UR QL: NEGATIVE MCG/ML
OXYCODONE UR CFM-MCNC: NEGATIVE NG/ML
OXYMORPHONE UR CFM-MCNC: NEGATIVE NG/ML
PCP UR QL: NEGATIVE NG/ML
PH UR: 6.8 [PH] (ref 4.5–9)
QUEST 6 ACETYLMORPHINE: NORMAL
QUEST NOTES AND COMMENTS: NORMAL
QUEST PATIENT HISTORICAL REPORT: NORMAL
QUEST ZOLPIDEM: NORMAL
TEMAZEPAM UR-MCNC: NEGATIVE NG/ML
THC UR QL: NEGATIVE NG/ML
TRAMADOL UR-MCNC: NEGATIVE NG/ML
ZOLPIDEM PHENYL-4-CARB UR CFM-MCNC: NORMAL NG/ML

## 2025-07-15 LAB
1OH-MIDAZOLAM UR-MCNC: NEGATIVE NG/ML
7AMINOCLONAZEPAM UR-MCNC: NEGATIVE NG/ML
A-OH ALPRAZ UR-MCNC: NEGATIVE NG/ML
A-OH-TRIAZOLAM UR-MCNC: NEGATIVE NG/ML
AMPHETAMINES UR QL: NEGATIVE NG/ML
BARBITURATES UR QL: NEGATIVE NG/ML
BZE UR QL: NEGATIVE NG/ML
CODEINE UR-MCNC: NEGATIVE NG/ML
CREAT UR-MCNC: 48.6 MG/DL
DRUG SCREEN COMMENT UR-IMP: NORMAL
EDDP UR-MCNC: NEGATIVE NG/ML
FENTANYL UR-MCNC: NEGATIVE NG/ML
HYDROCODONE UR-MCNC: NEGATIVE NG/ML
HYDROMORPHONE UR-MCNC: NEGATIVE NG/ML
LORAZEPAM UR-MCNC: NEGATIVE NG/ML
METHADONE UR-MCNC: NEGATIVE NG/ML
MORPHINE UR-MCNC: NEGATIVE NG/ML
NORDIAZEPAM UR-MCNC: NEGATIVE NG/ML
NORFENTANYL UR-MCNC: NEGATIVE NG/ML
NORHYDROCODONE UR CFM-MCNC: NEGATIVE NG/ML
NOROXYCODONE UR CFM-MCNC: NEGATIVE NG/ML
NORTRAMADOL UR-MCNC: NEGATIVE NG/ML
OH-ETHYLFLURAZ UR-MCNC: NEGATIVE NG/ML
OXAZEPAM UR-MCNC: NEGATIVE NG/ML
OXIDANTS UR QL: NEGATIVE MCG/ML
OXYCODONE UR CFM-MCNC: NEGATIVE NG/ML
OXYMORPHONE UR CFM-MCNC: NEGATIVE NG/ML
PCP UR QL: NEGATIVE NG/ML
PH UR: 6.8 [PH] (ref 4.5–9)
QUEST 6 ACETYLMORPHINE: NEGATIVE NG/ML
QUEST NOTES AND COMMENTS: NORMAL
QUEST ZOLPIDEM: NEGATIVE NG/ML
TEMAZEPAM UR-MCNC: NEGATIVE NG/ML
THC UR QL: NEGATIVE NG/ML
TRAMADOL UR-MCNC: NEGATIVE NG/ML
ZOLPIDEM PHENYL-4-CARB UR CFM-MCNC: NEGATIVE NG/ML

## 2025-07-25 ENCOUNTER — HOSPITAL ENCOUNTER (OUTPATIENT)
Dept: GASTROENTEROLOGY | Facility: HOSPITAL | Age: 80
Discharge: HOME | End: 2025-07-25
Payer: MEDICARE

## 2025-07-25 VITALS
TEMPERATURE: 96.6 F | HEIGHT: 67 IN | WEIGHT: 225 LBS | OXYGEN SATURATION: 94 % | HEART RATE: 89 BPM | BODY MASS INDEX: 35.31 KG/M2 | DIASTOLIC BLOOD PRESSURE: 77 MMHG | SYSTOLIC BLOOD PRESSURE: 114 MMHG | RESPIRATION RATE: 16 BRPM

## 2025-07-25 DIAGNOSIS — M46.1 SACROILIITIS: ICD-10-CM

## 2025-07-25 DIAGNOSIS — M47.816 LUMBAR SPONDYLOSIS: ICD-10-CM

## 2025-07-25 LAB — GLUCOSE BLD MANUAL STRIP-MCNC: 192 MG/DL (ref 74–99)

## 2025-07-25 PROCEDURE — 7100000010 HC PHASE TWO TIME - EACH INCREMENTAL 1 MINUTE

## 2025-07-25 PROCEDURE — 2500000004 HC RX 250 GENERAL PHARMACY W/ HCPCS (ALT 636 FOR OP/ED): Performed by: ANESTHESIOLOGY

## 2025-07-25 PROCEDURE — 82947 ASSAY GLUCOSE BLOOD QUANT: CPT

## 2025-07-25 PROCEDURE — 2550000001 HC RX 255 CONTRASTS: Performed by: ANESTHESIOLOGY

## 2025-07-25 PROCEDURE — 7100000009 HC PHASE TWO TIME - INITIAL BASE CHARGE

## 2025-07-25 PROCEDURE — 27096 INJECT SACROILIAC JOINT: CPT | Performed by: ANESTHESIOLOGY

## 2025-07-25 RX ORDER — TRIAMCINOLONE ACETONIDE 40 MG/ML
INJECTION, SUSPENSION INTRA-ARTICULAR; INTRAMUSCULAR AS NEEDED
Status: COMPLETED | OUTPATIENT
Start: 2025-07-25 | End: 2025-07-25

## 2025-07-25 RX ORDER — LIDOCAINE HYDROCHLORIDE 20 MG/ML
INJECTION, SOLUTION EPIDURAL; INFILTRATION; INTRACAUDAL; PERINEURAL AS NEEDED
Status: COMPLETED | OUTPATIENT
Start: 2025-07-25 | End: 2025-07-25

## 2025-07-25 RX ORDER — PREGABALIN 225 MG/1
225 CAPSULE ORAL 2 TIMES DAILY
Qty: 60 CAPSULE | Refills: 3 | Status: SHIPPED | OUTPATIENT
Start: 2025-07-25 | End: 2025-08-24

## 2025-07-25 RX ORDER — BUPIVACAINE HYDROCHLORIDE 5 MG/ML
INJECTION, SOLUTION PERINEURAL AS NEEDED
Status: COMPLETED | OUTPATIENT
Start: 2025-07-25 | End: 2025-07-25

## 2025-07-25 RX ADMIN — IOHEXOL 1 ML: 240 INJECTION, SOLUTION INTRATHECAL; INTRAVASCULAR; INTRAVENOUS; ORAL at 13:31

## 2025-07-25 RX ADMIN — LIDOCAINE HYDROCHLORIDE 3 ML: 20 INJECTION, SOLUTION EPIDURAL; INFILTRATION; INTRACAUDAL; PERINEURAL at 13:31

## 2025-07-25 RX ADMIN — BUPIVACAINE HYDROCHLORIDE 4 ML: 5 INJECTION, SOLUTION PERINEURAL at 13:31

## 2025-07-25 RX ADMIN — TRIAMCINOLONE ACETONIDE 40 MG: 40 INJECTION, SUSPENSION INTRA-ARTICULAR; INTRAMUSCULAR at 13:31

## 2025-07-25 ASSESSMENT — PAIN SCALES - GENERAL
PAINLEVEL_OUTOF10: 2
PAINLEVEL_OUTOF10: 5 - MODERATE PAIN

## 2025-07-25 ASSESSMENT — PAIN - FUNCTIONAL ASSESSMENT
PAIN_FUNCTIONAL_ASSESSMENT: 0-10
PAIN_FUNCTIONAL_ASSESSMENT: 0-10

## 2025-07-25 ASSESSMENT — PAIN DESCRIPTION - DESCRIPTORS
DESCRIPTORS: ACHING;SHARP
DESCRIPTORS: PRESSURE;SORE

## 2025-07-25 ASSESSMENT — COLUMBIA-SUICIDE SEVERITY RATING SCALE - C-SSRS
1. IN THE PAST MONTH, HAVE YOU WISHED YOU WERE DEAD OR WISHED YOU COULD GO TO SLEEP AND NOT WAKE UP?: NO
2. HAVE YOU ACTUALLY HAD ANY THOUGHTS OF KILLING YOURSELF?: NO
6. HAVE YOU EVER DONE ANYTHING, STARTED TO DO ANYTHING, OR PREPARED TO DO ANYTHING TO END YOUR LIFE?: NO

## 2025-07-25 NOTE — POST-PROCEDURE NOTE
1333-Patient brought back from procedure room. Bandaid to back dry and intact. Patient states pain improving, denies any weakness or decrease in sensation. Discharge instructions explained to patient and copy provided. Patient denies any questions. Patient to follow up as scheduled.    1349-Patient discharged via wheelchair accompanied by transport.

## 2025-07-25 NOTE — DISCHARGE INSTRUCTIONS
DISCHARGE INSTRUCTIONS FOR INJECTIONS     You underwent right sacroiliac joint injection today    Aftermost injections, it is recommended that you relax and limit your activity for the remainder of the day unless you have been told otherwise by your pain physician.  You should not drive a car, operate machinery, or make important legal decisions unless otherwise directed by your pain physician.  You may resume your normal activity, including exercise, tomorrow.      Keep a written pain diary of how much pain relief you experienced following the injection procedure and the length of time of pain relief you experienced pain relief. Following diagnostic injections like medial branch nerve blocks, sacroiliac joint blocks, stellate ganglion injections and other blocks, it is very important you record the specific amount of pain relief you experienced immediately after the injectionand how long it lasted. Your doctor will ask you for this information at your follow up visit.     For all injections, please keep the injection site dry and inspect the site for a couple of days. You may remove the Band-Aid the day of the injection at any time.     Some discomfort, bruising or slight swelling may occur at the injection site. This is not abnormal if it occurs.  If needed you may:    -Take over the counter medication such as Tylenol or Motrin.   -Apply an ice pack for 30 minutes, 2 to 3 times a day for the first 24 hours.     You may shower today; no soaking baths, hot tubs, whirlpools or swimming pools for two days.      If you are given steroids in your injection, it may take 3-5 days for the steroid medication to take effect. You may notice a worsening of your symptoms for 1-2 days after the injection. This is not abnormal.  You may use acetaminophen, ibuprofen, or prescription medication that your doctor may have prescribed for you if you need to do so.     A few common side effects of steroids include facial flushing,  sweating, restlessness, irritability,difficulty sleeping, increase in blood sugar, and increased blood pressure. If you have diabetes, please monitor your blood sugar at least once a day for at least 5 days. If you have poorly controlled high blood pressure, monitoryour blood pressure for at least 2 days and contact your primary care physician if these numbers are unusually high for you.      If you take aspirin or non-steroidal anti-inflammatory drugs (examples are Motrin, Advil, ibuprofen, Naprosyn, Voltaren, Relafen, etc.) you may restart these this evening, but stop taking it 3 days before your next appointment, unless instructed otherwiseby your physician.      You do not need to discontinue non-aspirin-containing pain medications prior to an injection (examples: Celebrex, tramadol, hydrocodone and acetaminophen).      If you take a blood thinning medication (Coumadin, Lovenox, Fragmin,Ticlid, Plavix, Pradaxa, etc.), please discuss this with your primary care physician/cardiologist and your pain physician. These medications MUST be discontinued before you can have an injection safely, without the risk of uncontrolled bleeding. If these medications are not discontinued for an appropriate period of time, you will not be able to receivean injection.      If you are taking Coumadin, please have your INR checked the morning of your procedure and bringthe result to your appointment unless otherwise instructed. If your INR is over 1.2, your injection may need to be rescheduled to avoid uncontrolled bleeding from the needle placement.     Call [facility] at [phone] between 8am-4pm Monday - Friday if you are experiencing the following:    If you received an epidural or spinal injection:    -Headache that doesnot go away with medicine, is worse when sitting or standing up, and is greatly relieved upon lying down.   -Severe pain worse than or different than your baseline pain.   -Chills or fever  (101º F or greater).   -Drainage or signs of infection at the injection site     Go directly to the Emergency Department if you are experiencing the following and received an epidural or spinal injection:   -Abrupt weakness or progressive weakness in your legs that starts after you leave the clinic.   -Abrupt severe or worsening numbness in your legs.   -Inability to urinate after the injection or loss of bowel or bladder control without the urge to defecate or urinate.     If you have a clinical question that cannot wait until your next appointment, please call [phone] between 8am-4pm Monday - Friday or send a [employer] message. We do our best to return all non-emergency messages within 24 hours, Monday - Friday. A nurse or physician will return your message.      If you need to cancel an appointment, please call the scheduling staff at [phone] during normal business hours or leave a message at least 24 hours in advance.     If you are going to be sedated for your next procedure, you MUST have responsible adult who can legally drive accompany you home. You cannot eat or drink for eight hours prior to the planned procedure if you are going to receive sedation. You may take your non-blood thinning medications with a small sip of water.

## 2025-07-30 DIAGNOSIS — M51.369 DDD (DEGENERATIVE DISC DISEASE), LUMBAR: ICD-10-CM

## 2025-07-31 RX ORDER — METHOCARBAMOL 750 MG/1
750 TABLET, FILM COATED ORAL 3 TIMES DAILY
Qty: 90 TABLET | Refills: 0 | Status: SHIPPED | OUTPATIENT
Start: 2025-07-31 | End: 2025-08-30

## 2025-08-08 ENCOUNTER — HOSPITAL ENCOUNTER (OUTPATIENT)
Dept: GASTROENTEROLOGY | Facility: HOSPITAL | Age: 80
Discharge: HOME | End: 2025-08-08
Payer: MEDICARE

## 2025-08-08 VITALS
RESPIRATION RATE: 14 BRPM | BODY MASS INDEX: 35.31 KG/M2 | SYSTOLIC BLOOD PRESSURE: 128 MMHG | WEIGHT: 225 LBS | DIASTOLIC BLOOD PRESSURE: 55 MMHG | OXYGEN SATURATION: 97 % | TEMPERATURE: 96.8 F | HEIGHT: 67 IN | HEART RATE: 71 BPM

## 2025-08-08 DIAGNOSIS — M54.16 LUMBAR RADICULITIS: ICD-10-CM

## 2025-08-08 LAB — GLUCOSE BLD MANUAL STRIP-MCNC: 103 MG/DL (ref 74–99)

## 2025-08-08 PROCEDURE — 2550000001 HC RX 255 CONTRASTS: Performed by: ANESTHESIOLOGY

## 2025-08-08 PROCEDURE — 7100000010 HC PHASE TWO TIME - EACH INCREMENTAL 1 MINUTE

## 2025-08-08 PROCEDURE — 2500000004 HC RX 250 GENERAL PHARMACY W/ HCPCS (ALT 636 FOR OP/ED): Performed by: ANESTHESIOLOGY

## 2025-08-08 PROCEDURE — 62323 NJX INTERLAMINAR LMBR/SAC: CPT | Performed by: ANESTHESIOLOGY

## 2025-08-08 PROCEDURE — 82947 ASSAY GLUCOSE BLOOD QUANT: CPT

## 2025-08-08 PROCEDURE — 7100000009 HC PHASE TWO TIME - INITIAL BASE CHARGE

## 2025-08-08 RX ORDER — LIDOCAINE HYDROCHLORIDE 20 MG/ML
INJECTION, SOLUTION EPIDURAL; INFILTRATION; INTRACAUDAL; PERINEURAL AS NEEDED
Status: COMPLETED | OUTPATIENT
Start: 2025-08-08 | End: 2025-08-08

## 2025-08-08 RX ORDER — TRIAMCINOLONE ACETONIDE 40 MG/ML
INJECTION, SUSPENSION INTRA-ARTICULAR; INTRAMUSCULAR AS NEEDED
Status: COMPLETED | OUTPATIENT
Start: 2025-08-08 | End: 2025-08-08

## 2025-08-08 RX ORDER — LIDOCAINE HYDROCHLORIDE 5 MG/ML
INJECTION, SOLUTION INFILTRATION; INTRAVENOUS AS NEEDED
Status: COMPLETED | OUTPATIENT
Start: 2025-08-08 | End: 2025-08-08

## 2025-08-08 RX ADMIN — LIDOCAINE HYDROCHLORIDE 9 ML: 5 INJECTION, SOLUTION INFILTRATION at 13:25

## 2025-08-08 RX ADMIN — TRIAMCINOLONE ACETONIDE 40 MG: 40 INJECTION, SUSPENSION INTRA-ARTICULAR; INTRAMUSCULAR at 13:25

## 2025-08-08 RX ADMIN — LIDOCAINE HYDROCHLORIDE 5 ML: 20 INJECTION, SOLUTION EPIDURAL; INFILTRATION; INTRACAUDAL; PERINEURAL at 13:25

## 2025-08-08 RX ADMIN — IOHEXOL 2 ML: 240 INJECTION, SOLUTION INTRATHECAL; INTRAVASCULAR; INTRAVENOUS; ORAL at 13:25

## 2025-08-08 ASSESSMENT — PAIN - FUNCTIONAL ASSESSMENT
PAIN_FUNCTIONAL_ASSESSMENT: 0-10
PAIN_FUNCTIONAL_ASSESSMENT: 0-10

## 2025-08-08 ASSESSMENT — PAIN SCALES - GENERAL
PAINLEVEL_OUTOF10: 8
PAINLEVEL_OUTOF10: 6

## 2025-08-08 ASSESSMENT — PAIN DESCRIPTION - DESCRIPTORS
DESCRIPTORS: ACHING
DESCRIPTORS: ACHING

## 2025-08-08 NOTE — NURSING NOTE
1338 VSS, denies dizziness, bandaids clean and dry without drainage. Discussed AVS with patient and paper copy provided.   1346 Patient stood and felt light headed momentarily, patient sat down. After a couple min, patient stood again and denied dizziness.   1349 Out, ambulated to lobby with even steady gait, per patient his wife Inder is driving. Madhavi present in waiting room.

## 2025-08-08 NOTE — DISCHARGE INSTRUCTIONS
DISCHARGE INSTRUCTIONS FOR INJECTIONS     You underwent lumbar epidural today    Aftermost injections, it is recommended that you relax and limit your activity for the remainder of the day unless you have been told otherwise by your pain physician.  You should not drive a car, operate machinery, or make important legal decisions unless otherwise directed by your pain physician.  You may resume your normal activity, including exercise, tomorrow.      Keep a written pain diary of how much pain relief you experienced following the injection procedure and the length of time of pain relief you experienced pain relief. Following diagnostic injections like medial branch nerve blocks, sacroiliac joint blocks, stellate ganglion injections and other blocks, it is very important you record the specific amount of pain relief you experienced immediately after the injectionand how long it lasted. Your doctor will ask you for this information at your follow up visit.     For all injections, please keep the injection site dry and inspect the site for a couple of days. You may remove the Band-Aid the day of the injection at any time.     Some discomfort, bruising or slight swelling may occur at the injection site. This is not abnormal if it occurs.  If needed you may:    -Take over the counter medication such as Tylenol or Motrin.   -Apply an ice pack for 30 minutes, 2 to 3 times a day for the first 24 hours.     You may shower today; no soaking baths, hot tubs, whirlpools or swimming pools for two days.      If you are given steroids in your injection, it may take 3-5 days for the steroid medication to take effect. You may notice a worsening of your symptoms for 1-2 days after the injection. This is not abnormal.  You may use acetaminophen, ibuprofen, or prescription medication that your doctor may have prescribed for you if you need to do so.     A few common side effects of steroids include facial flushing, sweating,  restlessness, irritability,difficulty sleeping, increase in blood sugar, and increased blood pressure. If you have diabetes, please monitor your blood sugar at least once a day for at least 5 days. If you have poorly controlled high blood pressure, monitoryour blood pressure for at least 2 days and contact your primary care physician if these numbers are unusually high for you.      If you take aspirin or non-steroidal anti-inflammatory drugs (examples are Motrin, Advil, ibuprofen, Naprosyn, Voltaren, Relafen, etc.) you may restart these this evening, but stop taking it 3 days before your next appointment, unless instructed otherwiseby your physician.      You do not need to discontinue non-aspirin-containing pain medications prior to an injection (examples: Celebrex, tramadol, hydrocodone and acetaminophen).      If you take a blood thinning medication (Coumadin, Lovenox, Fragmin,Ticlid, Plavix, Pradaxa, etc.), please discuss this with your primary care physician/cardiologist and your pain physician. These medications MUST be discontinued before you can have an injection safely, without the risk of uncontrolled bleeding. If these medications are not discontinued for an appropriate period of time, you will not be able to receivean injection.      If you are taking Coumadin, please have your INR checked the morning of your procedure and bringthe result to your appointment unless otherwise instructed. If your INR is over 1.2, your injection may need to be rescheduled to avoid uncontrolled bleeding from the needle placement.     Call [facility] at [phone] between 8am-4pm Monday - Friday if you are experiencing the following:    If you received an epidural or spinal injection:    -Headache that doesnot go away with medicine, is worse when sitting or standing up, and is greatly relieved upon lying down.   -Severe pain worse than or different than your baseline pain.   -Chills or fever (101º F or  greater).   -Drainage or signs of infection at the injection site     Go directly to the Emergency Department if you are experiencing the following and received an epidural or spinal injection:   -Abrupt weakness or progressive weakness in your legs that starts after you leave the clinic.   -Abrupt severe or worsening numbness in your legs.   -Inability to urinate after the injection or loss of bowel or bladder control without the urge to defecate or urinate.     If you have a clinical question that cannot wait until your next appointment, please call [phone] between 8am-4pm Monday - Friday or send a [employer] message. We do our best to return all non-emergency messages within 24 hours, Monday - Friday. A nurse or physician will return your message.      If you need to cancel an appointment, please call the scheduling staff at [phone] during normal business hours or leave a message at least 24 hours in advance.     If you are going to be sedated for your next procedure, you MUST have responsible adult who can legally drive accompany you home. You cannot eat or drink for eight hours prior to the planned procedure if you are going to receive sedation. You may take your non-blood thinning medications with a small sip of water.

## 2025-10-09 ENCOUNTER — APPOINTMENT (OUTPATIENT)
Dept: PRIMARY CARE | Facility: CLINIC | Age: 80
End: 2025-10-09
Payer: MEDICARE